# Patient Record
Sex: MALE | Race: WHITE | NOT HISPANIC OR LATINO | Employment: FULL TIME | URBAN - METROPOLITAN AREA
[De-identification: names, ages, dates, MRNs, and addresses within clinical notes are randomized per-mention and may not be internally consistent; named-entity substitution may affect disease eponyms.]

---

## 2023-05-29 DIAGNOSIS — F41.9 ANXIETY: Primary | ICD-10-CM

## 2023-05-30 RX ORDER — NORTRIPTYLINE HYDROCHLORIDE 10 MG/1
CAPSULE ORAL
Qty: 270 CAPSULE | Refills: 3 | Status: SHIPPED | OUTPATIENT
Start: 2023-05-30 | End: 2023-06-09 | Stop reason: SDUPTHER

## 2023-06-09 ENCOUNTER — OFFICE VISIT (OUTPATIENT)
Dept: PRIMARY CARE | Facility: CLINIC | Age: 63
End: 2023-06-09
Payer: COMMERCIAL

## 2023-06-09 VITALS
BODY MASS INDEX: 22.54 KG/M2 | DIASTOLIC BLOOD PRESSURE: 62 MMHG | SYSTOLIC BLOOD PRESSURE: 122 MMHG | OXYGEN SATURATION: 99 % | HEIGHT: 71 IN | WEIGHT: 161 LBS | HEART RATE: 78 BPM

## 2023-06-09 DIAGNOSIS — I10 PRIMARY HYPERTENSION: ICD-10-CM

## 2023-06-09 DIAGNOSIS — E78.5 HYPERLIPIDEMIA, UNSPECIFIED HYPERLIPIDEMIA TYPE: ICD-10-CM

## 2023-06-09 DIAGNOSIS — I25.10 CORONARY ARTERY DISEASE INVOLVING NATIVE CORONARY ARTERY OF NATIVE HEART WITHOUT ANGINA PECTORIS: ICD-10-CM

## 2023-06-09 DIAGNOSIS — Z00.00 PERIODIC HEALTH ASSESSMENT, GENERAL SCREENING, ADULT: Primary | ICD-10-CM

## 2023-06-09 DIAGNOSIS — Z12.5 SCREENING FOR PROSTATE CANCER: ICD-10-CM

## 2023-06-09 DIAGNOSIS — F41.9 ANXIETY: ICD-10-CM

## 2023-06-09 PROCEDURE — 99396 PREV VISIT EST AGE 40-64: CPT | Performed by: INTERNAL MEDICINE

## 2023-06-09 PROCEDURE — 3078F DIAST BP <80 MM HG: CPT | Performed by: INTERNAL MEDICINE

## 2023-06-09 PROCEDURE — 90677 PCV20 VACCINE IM: CPT | Performed by: INTERNAL MEDICINE

## 2023-06-09 PROCEDURE — 3074F SYST BP LT 130 MM HG: CPT | Performed by: INTERNAL MEDICINE

## 2023-06-09 PROCEDURE — 90471 IMMUNIZATION ADMIN: CPT | Performed by: INTERNAL MEDICINE

## 2023-06-09 RX ORDER — PHENYLEPHRINE HCL 10 MG
TABLET ORAL
Status: ON HOLD | COMMUNITY
Start: 2021-05-26 | End: 2023-09-30 | Stop reason: ENTERED-IN-ERROR

## 2023-06-09 RX ORDER — CHLOROQUINE PHOSPHATE 500 MG/1
TABLET, COATED ORAL
Status: ON HOLD | COMMUNITY
Start: 2022-12-13 | End: 2023-09-30 | Stop reason: ENTERED-IN-ERROR

## 2023-06-09 RX ORDER — GUAIFENESIN AND DEXTROMETHORPHAN HYDROBROMIDE 600; 30 MG/1; MG/1
TABLET, EXTENDED RELEASE ORAL
Status: ON HOLD | COMMUNITY
End: 2023-09-30 | Stop reason: ENTERED-IN-ERROR

## 2023-06-09 RX ORDER — LATANOPROST 50 UG/ML
1 SOLUTION/ DROPS OPHTHALMIC NIGHTLY
COMMUNITY
Start: 2020-02-19 | End: 2024-01-26 | Stop reason: SDUPTHER

## 2023-06-09 RX ORDER — CIPROFLOXACIN 500 MG/1
TABLET ORAL
Status: ON HOLD | COMMUNITY
Start: 2022-12-13 | End: 2023-09-30 | Stop reason: ENTERED-IN-ERROR

## 2023-06-09 RX ORDER — GUAIFENESIN 400 MG/1
200 TABLET ORAL NIGHTLY
COMMUNITY
End: 2023-12-06 | Stop reason: ALTCHOICE

## 2023-06-09 RX ORDER — NITROGLYCERIN 0.4 MG/1
TABLET SUBLINGUAL
Status: ON HOLD | COMMUNITY
End: 2023-09-30 | Stop reason: ENTERED-IN-ERROR

## 2023-06-09 RX ORDER — ATENOLOL 25 MG/1
1.5 TABLET ORAL DAILY
Status: ON HOLD | COMMUNITY
Start: 2019-03-14 | End: 2023-09-30 | Stop reason: SDUPTHER

## 2023-06-09 RX ORDER — RANOLAZINE 1000 MG/1
1000 TABLET, EXTENDED RELEASE ORAL 2 TIMES DAILY
COMMUNITY
Start: 2020-09-28 | End: 2024-01-03 | Stop reason: SDUPTHER

## 2023-06-09 RX ORDER — AMLODIPINE BESYLATE 5 MG/1
5 TABLET ORAL DAILY
COMMUNITY
Start: 2023-06-08 | End: 2024-01-03 | Stop reason: SDUPTHER

## 2023-06-09 RX ORDER — MULTIVIT-MIN/IRON FUM/FOLIC AC 7.5 MG-4
1 TABLET ORAL
COMMUNITY
End: 2023-12-06 | Stop reason: ALTCHOICE

## 2023-06-09 RX ORDER — TRIAMCINOLONE ACETONIDE 55 UG/1
1 SPRAY, METERED NASAL EVERY OTHER DAY
COMMUNITY

## 2023-06-09 RX ORDER — ATORVASTATIN CALCIUM 80 MG/1
1 TABLET, FILM COATED ORAL NIGHTLY
COMMUNITY
Start: 2020-05-01 | End: 2024-01-03 | Stop reason: SDUPTHER

## 2023-06-09 RX ORDER — ALBUTEROL SULFATE 90 UG/1
2 AEROSOL, METERED RESPIRATORY (INHALATION) EVERY 6 HOURS PRN
COMMUNITY
Start: 2019-03-19

## 2023-06-09 RX ORDER — NORTRIPTYLINE HYDROCHLORIDE 10 MG/1
30 CAPSULE ORAL NIGHTLY
Qty: 270 CAPSULE | Refills: 3 | Status: SHIPPED | OUTPATIENT
Start: 2023-06-09 | End: 2024-01-03 | Stop reason: SDUPTHER

## 2023-06-09 RX ORDER — ASPIRIN 81 MG/1
1 TABLET ORAL DAILY
COMMUNITY
Start: 2019-10-30

## 2023-06-09 RX ORDER — ISOSORBIDE MONONITRATE 60 MG/1
1 TABLET, EXTENDED RELEASE ORAL DAILY
Status: ON HOLD | COMMUNITY
Start: 2022-05-04 | End: 2023-09-30 | Stop reason: SDUPTHER

## 2023-06-09 RX ORDER — LOPERAMIDE HYDROCHLORIDE 2 MG/1
CAPSULE ORAL
Status: ON HOLD | COMMUNITY
Start: 2022-12-13 | End: 2023-09-30 | Stop reason: ENTERED-IN-ERROR

## 2023-06-09 RX ORDER — UBIQUINOL 100 MG
CAPSULE ORAL
Status: ON HOLD | COMMUNITY
Start: 2021-05-26 | End: 2023-09-30 | Stop reason: ENTERED-IN-ERROR

## 2023-06-09 RX ORDER — FLUTICASONE PROPIONATE AND SALMETEROL 250; 50 UG/1; UG/1
1 POWDER RESPIRATORY (INHALATION) DAILY PRN
COMMUNITY
End: 2024-03-06 | Stop reason: SDUPTHER

## 2023-06-09 RX ORDER — RIVAROXABAN 2.5 MG/1
1 TABLET, FILM COATED ORAL 2 TIMES DAILY
Status: ON HOLD | COMMUNITY
Start: 2021-12-02 | End: 2023-09-30 | Stop reason: ENTERED-IN-ERROR

## 2023-06-09 RX ORDER — VALSARTAN 80 MG/1
1 TABLET ORAL 2 TIMES DAILY
Status: ON HOLD | COMMUNITY
Start: 2020-05-02 | End: 2023-09-30 | Stop reason: ENTERED-IN-ERROR

## 2023-06-09 RX ORDER — MAGNESIUM 200 MG
1 TABLET ORAL DAILY
COMMUNITY
Start: 2019-10-30

## 2023-06-09 ASSESSMENT — ENCOUNTER SYMPTOMS
CARDIOVASCULAR NEGATIVE: 1
RESPIRATORY NEGATIVE: 1
CONSTITUTIONAL NEGATIVE: 1

## 2023-06-09 ASSESSMENT — PAIN SCALES - GENERAL: PAINLEVEL: 0-NO PAIN

## 2023-06-09 NOTE — PROGRESS NOTES
"Subjective   Patient ID: Dc Elizondo is a 62 y.o. male who presents for APE and  Follow-up (Follow up on asthma and HTN/No concerns at the moment. ).  Overall doing well.  Patient is fairly active.  Denies any issues with CP,SOB or dizzy spells.  He has had issues with Cp in the past, but has been doing much better as of late and is trying to wean off of his nitroglycerin.  He admits to some anxiety, but manageable.  Sleep medication helps.  Denies any issues with HA, numbness or tingling.  No issues or changes with bowel or bladder habits.         Review of Systems   Constitutional: Negative.    HENT: Negative.     Respiratory: Negative.     Cardiovascular: Negative.    ROS is otherwise unremarkable.     Objective   /62 (BP Location: Left arm, Patient Position: Sitting, BP Cuff Size: Adult)   Pulse 78   Ht 1.803 m (5' 11\")   Wt 73 kg (161 lb)   SpO2 99%   BMI 22.45 kg/m²     Physical Exam  Constitutional:       General: He is not in acute distress.     Appearance: Normal appearance. He is not ill-appearing.   HENT:      Head: Normocephalic and atraumatic.      Nose: Nose normal.   Eyes:      Extraocular Movements: Extraocular movements intact.      Conjunctiva/sclera: Conjunctivae normal.      Pupils: Pupils are equal, round, and reactive to light.   Cardiovascular:      Rate and Rhythm: Normal rate and regular rhythm.      Heart sounds: Normal heart sounds.   Pulmonary:      Effort: Pulmonary effort is normal.      Breath sounds: Normal breath sounds.   Abdominal:      General: There is no distension.   Musculoskeletal:         General: Normal range of motion.      Cervical back: Neck supple.   Neurological:      General: No focal deficit present.      Mental Status: He is alert.      Gait: Gait normal.   Psychiatric:         Mood and Affect: Mood normal.         Behavior: Behavior normal.         Assessment/Plan   Problem List Items Addressed This Visit    None  Visit Diagnoses       " Periodic health assessment, general screening, adult    -  Primary    Relevant Orders    CBC    Comprehensive Metabolic Panel    Lipid Panel    Thyroid Stimulating Hormone    Primary hypertension        Hyperlipidemia, unspecified hyperlipidemia type        Coronary artery disease involving native coronary artery of native heart without angina pectoris        Relevant Medications    ranolazine (Ranexa) 1,000 mg 12 hr tablet    nitroglycerin (Nitrostat) 0.4 mg SL tablet    isosorbide mononitrate ER (Imdur) 60 mg 24 hr tablet    atenolol (Tenormin) 25 mg tablet    amLODIPine (Norvasc) 5 mg tablet    Screening for prostate cancer        Relevant Orders    Prostate Specific Antigen    Anxiety        Relevant Medications    nortriptyline (Pamelor) 10 mg capsule        Physical exam is unremarkable.  We reviewed and discussed all the above.  We discussed current medications as well as most recent test results.  We discussed the importance and benefits of a healthy diet that is both low in sugars and low in saturated fats.  We reviewed and discussed the benefits of regular physical exercise.  We also discussed the importance of stress management and good sleep hygiene.  We will continue to work on lifestyle improvements and follow-up in 6 months, sooner if any issues should arise.

## 2023-07-20 ENCOUNTER — LAB (OUTPATIENT)
Dept: LAB | Facility: LAB | Age: 63
End: 2023-07-20
Payer: COMMERCIAL

## 2023-07-20 DIAGNOSIS — Z12.5 SCREENING FOR PROSTATE CANCER: ICD-10-CM

## 2023-07-20 DIAGNOSIS — Z00.00 PERIODIC HEALTH ASSESSMENT, GENERAL SCREENING, ADULT: ICD-10-CM

## 2023-07-20 LAB
ALANINE AMINOTRANSFERASE (SGPT) (U/L) IN SER/PLAS: 22 U/L (ref 10–52)
ALBUMIN (G/DL) IN SER/PLAS: 4.3 G/DL (ref 3.4–5)
ALKALINE PHOSPHATASE (U/L) IN SER/PLAS: 56 U/L (ref 33–136)
ANION GAP IN SER/PLAS: 11 MMOL/L (ref 10–20)
ASPARTATE AMINOTRANSFERASE (SGOT) (U/L) IN SER/PLAS: 19 U/L (ref 9–39)
BILIRUBIN TOTAL (MG/DL) IN SER/PLAS: 1 MG/DL (ref 0–1.2)
CALCIUM (MG/DL) IN SER/PLAS: 9.5 MG/DL (ref 8.6–10.6)
CARBON DIOXIDE, TOTAL (MMOL/L) IN SER/PLAS: 28 MMOL/L (ref 21–32)
CHLORIDE (MMOL/L) IN SER/PLAS: 107 MMOL/L (ref 98–107)
CHOLESTEROL (MG/DL) IN SER/PLAS: 116 MG/DL (ref 0–199)
CHOLESTEROL IN HDL (MG/DL) IN SER/PLAS: 39.9 MG/DL
CHOLESTEROL/HDL RATIO: 2.9
CREATININE (MG/DL) IN SER/PLAS: 0.91 MG/DL (ref 0.5–1.3)
ERYTHROCYTE DISTRIBUTION WIDTH (RATIO) BY AUTOMATED COUNT: 14.3 % (ref 11.5–14.5)
ERYTHROCYTE MEAN CORPUSCULAR HEMOGLOBIN CONCENTRATION (G/DL) BY AUTOMATED: 31.7 G/DL (ref 32–36)
ERYTHROCYTE MEAN CORPUSCULAR VOLUME (FL) BY AUTOMATED COUNT: 88 FL (ref 80–100)
ERYTHROCYTES (10*6/UL) IN BLOOD BY AUTOMATED COUNT: 4.96 X10E12/L (ref 4.5–5.9)
GFR MALE: >90 ML/MIN/1.73M2
GLUCOSE (MG/DL) IN SER/PLAS: 91 MG/DL (ref 74–99)
HEMATOCRIT (%) IN BLOOD BY AUTOMATED COUNT: 43.6 % (ref 41–52)
HEMOGLOBIN (G/DL) IN BLOOD: 13.8 G/DL (ref 13.5–17.5)
LDL: 57 MG/DL (ref 0–99)
LEUKOCYTES (10*3/UL) IN BLOOD BY AUTOMATED COUNT: 5.2 X10E9/L (ref 4.4–11.3)
NRBC (PER 100 WBCS) BY AUTOMATED COUNT: 0 /100 WBC (ref 0–0)
PLATELETS (10*3/UL) IN BLOOD AUTOMATED COUNT: 256 X10E9/L (ref 150–450)
POTASSIUM (MMOL/L) IN SER/PLAS: 4.9 MMOL/L (ref 3.5–5.3)
PROSTATE SPECIFIC AG (NG/ML) IN SER/PLAS: 2.39 NG/ML (ref 0–4)
PROTEIN TOTAL: 6.8 G/DL (ref 6.4–8.2)
SODIUM (MMOL/L) IN SER/PLAS: 141 MMOL/L (ref 136–145)
THYROTROPIN (MIU/L) IN SER/PLAS BY DETECTION LIMIT <= 0.05 MIU/L: 1.95 MIU/L (ref 0.44–3.98)
TRIGLYCERIDE (MG/DL) IN SER/PLAS: 94 MG/DL (ref 0–149)
UREA NITROGEN (MG/DL) IN SER/PLAS: 21 MG/DL (ref 6–23)
VLDL: 19 MG/DL (ref 0–40)

## 2023-07-20 PROCEDURE — 80053 COMPREHEN METABOLIC PANEL: CPT

## 2023-07-20 PROCEDURE — 84153 ASSAY OF PSA TOTAL: CPT

## 2023-07-20 PROCEDURE — 85027 COMPLETE CBC AUTOMATED: CPT

## 2023-07-20 PROCEDURE — 80061 LIPID PANEL: CPT

## 2023-07-20 PROCEDURE — 84443 ASSAY THYROID STIM HORMONE: CPT

## 2023-07-20 PROCEDURE — 36415 COLL VENOUS BLD VENIPUNCTURE: CPT

## 2023-09-29 ENCOUNTER — HOSPITAL ENCOUNTER (OUTPATIENT)
Dept: DATA CONVERSION | Facility: HOSPITAL | Age: 63
Setting detail: OBSERVATION
Discharge: HOME | End: 2023-09-30
Attending: INTERNAL MEDICINE | Admitting: INTERNAL MEDICINE
Payer: COMMERCIAL

## 2023-09-29 DIAGNOSIS — I25.10 CORONARY ARTERY DISEASE INVOLVING NATIVE CORONARY ARTERY OF NATIVE HEART, UNSPECIFIED WHETHER ANGINA PRESENT: ICD-10-CM

## 2023-09-29 DIAGNOSIS — F43.9 CHEST PAIN DUE TO PSYCHOLOGICAL STRESS: Primary | ICD-10-CM

## 2023-09-29 DIAGNOSIS — R07.9 CHEST PAIN, UNSPECIFIED: ICD-10-CM

## 2023-09-29 DIAGNOSIS — R07.9 CHEST PAIN DUE TO PSYCHOLOGICAL STRESS: Primary | ICD-10-CM

## 2023-09-29 LAB
ALANINE AMINOTRANSFERASE (SGPT) (U/L) IN SER/PLAS: 22 U/L (ref 10–52)
ALBUMIN (G/DL) IN SER/PLAS: 4.4 G/DL (ref 3.4–5)
ALKALINE PHOSPHATASE (U/L) IN SER/PLAS: 56 U/L (ref 33–136)
ANION GAP IN SER/PLAS: 11 MMOL/L (ref 10–20)
ANION GAP IN SER/PLAS: 9 MMOL/L (ref 10–20)
ASPARTATE AMINOTRANSFERASE (SGOT) (U/L) IN SER/PLAS: 19 U/L (ref 9–39)
BASOPHILS (10*3/UL) IN BLOOD BY AUTOMATED COUNT: 0.03 X10E9/L (ref 0–0.1)
BASOPHILS/100 LEUKOCYTES IN BLOOD BY AUTOMATED COUNT: 0.6 % (ref 0–2)
BILIRUBIN TOTAL (MG/DL) IN SER/PLAS: 0.9 MG/DL (ref 0–1.2)
CALCIUM (MG/DL) IN SER/PLAS: 9.3 MG/DL (ref 8.6–10.3)
CALCIUM (MG/DL) IN SER/PLAS: 9.3 MG/DL (ref 8.6–10.3)
CARBON DIOXIDE, TOTAL (MMOL/L) IN SER/PLAS: 27 MMOL/L (ref 21–32)
CARBON DIOXIDE, TOTAL (MMOL/L) IN SER/PLAS: 30 MMOL/L (ref 21–32)
CHLORIDE (MMOL/L) IN SER/PLAS: 105 MMOL/L (ref 98–107)
CHLORIDE (MMOL/L) IN SER/PLAS: 107 MMOL/L (ref 98–107)
CREATININE (MG/DL) IN SER/PLAS: 0.86 MG/DL (ref 0.5–1.3)
CREATININE (MG/DL) IN SER/PLAS: 0.9 MG/DL (ref 0.5–1.3)
D-DIMER, QUANTITATIVE VTE EXCLUSION: <215 NG/ML FEU
EOSINOPHILS (10*3/UL) IN BLOOD BY AUTOMATED COUNT: 0.25 X10E9/L (ref 0–0.7)
EOSINOPHILS/100 LEUKOCYTES IN BLOOD BY AUTOMATED COUNT: 4.9 % (ref 0–6)
ERYTHROCYTE DISTRIBUTION WIDTH (RATIO) BY AUTOMATED COUNT: 13.2 % (ref 11.5–14.5)
ERYTHROCYTE DISTRIBUTION WIDTH (RATIO) BY AUTOMATED COUNT: 13.2 % (ref 11.5–14.5)
ERYTHROCYTE MEAN CORPUSCULAR HEMOGLOBIN CONCENTRATION (G/DL) BY AUTOMATED: 31.7 G/DL (ref 32–36)
ERYTHROCYTE MEAN CORPUSCULAR HEMOGLOBIN CONCENTRATION (G/DL) BY AUTOMATED: 31.9 G/DL (ref 32–36)
ERYTHROCYTE MEAN CORPUSCULAR VOLUME (FL) BY AUTOMATED COUNT: 88 FL (ref 80–100)
ERYTHROCYTE MEAN CORPUSCULAR VOLUME (FL) BY AUTOMATED COUNT: 88 FL (ref 80–100)
ERYTHROCYTES (10*6/UL) IN BLOOD BY AUTOMATED COUNT: 4.81 X10E12/L (ref 4.5–5.9)
ERYTHROCYTES (10*6/UL) IN BLOOD BY AUTOMATED COUNT: 4.87 X10E12/L (ref 4.5–5.9)
GFR MALE: >90 ML/MIN/1.73M2
GFR MALE: >90 ML/MIN/1.73M2
GLUCOSE (MG/DL) IN SER/PLAS: 85 MG/DL (ref 74–99)
GLUCOSE (MG/DL) IN SER/PLAS: 94 MG/DL (ref 74–99)
HEMATOCRIT (%) IN BLOOD BY AUTOMATED COUNT: 42.3 % (ref 41–52)
HEMATOCRIT (%) IN BLOOD BY AUTOMATED COUNT: 42.7 % (ref 41–52)
HEMOGLOBIN (G/DL) IN BLOOD: 13.4 G/DL (ref 13.5–17.5)
HEMOGLOBIN (G/DL) IN BLOOD: 13.6 G/DL (ref 13.5–17.5)
IMMATURE GRANULOCYTES/100 LEUKOCYTES IN BLOOD BY AUTOMATED COUNT: 0.2 % (ref 0–0.9)
LEUKOCYTES (10*3/UL) IN BLOOD BY AUTOMATED COUNT: 5.1 X10E9/L (ref 4.4–11.3)
LEUKOCYTES (10*3/UL) IN BLOOD BY AUTOMATED COUNT: 5.2 X10E9/L (ref 4.4–11.3)
LYMPHOCYTES (10*3/UL) IN BLOOD BY AUTOMATED COUNT: 1.38 X10E9/L (ref 1.2–4.8)
LYMPHOCYTES/100 LEUKOCYTES IN BLOOD BY AUTOMATED COUNT: 27.2 % (ref 13–44)
MAGNESIUM (MG/DL) IN SER/PLAS: 2.11 MG/DL (ref 1.6–2.4)
MONOCYTES (10*3/UL) IN BLOOD BY AUTOMATED COUNT: 0.6 X10E9/L (ref 0.1–1)
MONOCYTES/100 LEUKOCYTES IN BLOOD BY AUTOMATED COUNT: 11.8 % (ref 2–10)
NEUTROPHILS (10*3/UL) IN BLOOD BY AUTOMATED COUNT: 2.8 X10E9/L (ref 1.2–7.7)
NEUTROPHILS/100 LEUKOCYTES IN BLOOD BY AUTOMATED COUNT: 55.3 % (ref 40–80)
NRBC (PER 100 WBCS) BY AUTOMATED COUNT: 0 /100 WBC (ref 0–0)
NRBC (PER 100 WBCS) BY AUTOMATED COUNT: 0 /100 WBC (ref 0–0)
PLATELETS (10*3/UL) IN BLOOD AUTOMATED COUNT: 235 X10E9/L (ref 150–450)
PLATELETS (10*3/UL) IN BLOOD AUTOMATED COUNT: 238 X10E9/L (ref 150–450)
POTASSIUM (MMOL/L) IN SER/PLAS: 3.8 MMOL/L (ref 3.5–5.3)
POTASSIUM (MMOL/L) IN SER/PLAS: 4.6 MMOL/L (ref 3.5–5.3)
PROTEIN TOTAL: 7.4 G/DL (ref 6.4–8.2)
SARS-COV-2 RESULT: NOT DETECTED
SODIUM (MMOL/L) IN SER/PLAS: 140 MMOL/L (ref 136–145)
SODIUM (MMOL/L) IN SER/PLAS: 140 MMOL/L (ref 136–145)
TROPONIN I, HIGH SENSITIVITY: 3 NG/L (ref 0–20)
TROPONIN I, HIGH SENSITIVITY: 6 NG/L (ref 0–20)
TROPONIN I, HIGH SENSITIVITY: 7 NG/L (ref 0–20)
UREA NITROGEN (MG/DL) IN SER/PLAS: 17 MG/DL (ref 6–23)
UREA NITROGEN (MG/DL) IN SER/PLAS: 18 MG/DL (ref 6–23)

## 2023-09-29 PROCEDURE — 71045 X-RAY EXAM CHEST 1 VIEW: CPT

## 2023-09-29 PROCEDURE — 36415 COLL VENOUS BLD VENIPUNCTURE: CPT

## 2023-09-29 PROCEDURE — 85379 FIBRIN DEGRADATION QUANT: CPT

## 2023-09-29 PROCEDURE — 99285 EMERGENCY DEPT VISIT HI MDM: CPT

## 2023-09-29 PROCEDURE — 84484 ASSAY OF TROPONIN QUANT: CPT

## 2023-09-29 PROCEDURE — 9990 CHARGE CONVERSION

## 2023-09-29 PROCEDURE — 85027 COMPLETE CBC AUTOMATED: CPT

## 2023-09-29 PROCEDURE — 80053 COMPREHEN METABOLIC PANEL: CPT

## 2023-09-29 PROCEDURE — 87635 SARS-COV-2 COVID-19 AMP PRB: CPT

## 2023-09-29 PROCEDURE — 83735 ASSAY OF MAGNESIUM: CPT

## 2023-09-29 PROCEDURE — 85025 COMPLETE CBC W/AUTO DIFF WBC: CPT

## 2023-09-29 RX ORDER — LATANOPROST 50 UG/ML
1 SOLUTION/ DROPS OPHTHALMIC NIGHTLY
Status: DISCONTINUED | OUTPATIENT
Start: 2023-09-30 | End: 2023-09-30 | Stop reason: HOSPADM

## 2023-09-29 RX ORDER — FLUTICASONE PROPIONATE 50 MCG
1 SPRAY, SUSPENSION (ML) NASAL DAILY
Status: DISCONTINUED | OUTPATIENT
Start: 2023-09-30 | End: 2023-09-30 | Stop reason: HOSPADM

## 2023-09-29 RX ORDER — FORMOTEROL FUMARATE DIHYDRATE 20 UG/2ML
20 SOLUTION RESPIRATORY (INHALATION) EVERY 12 HOURS
Status: DISCONTINUED | OUTPATIENT
Start: 2023-09-30 | End: 2023-09-30 | Stop reason: HOSPADM

## 2023-09-29 RX ORDER — AMLODIPINE BESYLATE 5 MG/1
5 TABLET ORAL DAILY
Status: DISCONTINUED | OUTPATIENT
Start: 2023-09-30 | End: 2023-09-30 | Stop reason: HOSPADM

## 2023-09-29 RX ORDER — MULTIVIT-MIN/IRON FUM/FOLIC AC 7.5 MG-4
1 TABLET ORAL DAILY
Status: DISCONTINUED | OUTPATIENT
Start: 2023-09-30 | End: 2023-09-30 | Stop reason: HOSPADM

## 2023-09-29 RX ORDER — RANOLAZINE 500 MG/1
1000 TABLET, EXTENDED RELEASE ORAL 2 TIMES DAILY
Status: DISCONTINUED | OUTPATIENT
Start: 2023-09-30 | End: 2023-09-30 | Stop reason: HOSPADM

## 2023-09-29 RX ORDER — BUDESONIDE 0.5 MG/2ML
0.5 INHALANT ORAL EVERY 12 HOURS
Status: DISCONTINUED | OUTPATIENT
Start: 2023-09-30 | End: 2023-09-30

## 2023-09-29 RX ORDER — NAPROXEN SODIUM 220 MG/1
81 TABLET, FILM COATED ORAL DAILY
Status: DISCONTINUED | OUTPATIENT
Start: 2023-09-30 | End: 2023-09-30 | Stop reason: HOSPADM

## 2023-09-29 RX ORDER — NORTRIPTYLINE HYDROCHLORIDE 25 MG/1
25 CAPSULE ORAL NIGHTLY
Status: DISCONTINUED | OUTPATIENT
Start: 2023-09-30 | End: 2023-09-30 | Stop reason: HOSPADM

## 2023-09-29 RX ORDER — NITROGLYCERIN 0.4 MG/1
0.4 TABLET SUBLINGUAL EVERY 5 MIN PRN
Status: DISCONTINUED | OUTPATIENT
Start: 2023-09-30 | End: 2023-09-30 | Stop reason: HOSPADM

## 2023-09-29 RX ORDER — ATENOLOL 25 MG/1
37.5 TABLET ORAL DAILY
Status: DISCONTINUED | OUTPATIENT
Start: 2023-09-30 | End: 2023-09-30

## 2023-09-29 RX ORDER — LANOLIN ALCOHOL/MO/W.PET/CERES
400 CREAM (GRAM) TOPICAL DAILY
Status: DISCONTINUED | OUTPATIENT
Start: 2023-09-30 | End: 2023-09-30 | Stop reason: HOSPADM

## 2023-09-29 RX ORDER — ISOSORBIDE MONONITRATE 30 MG/1
30 TABLET, EXTENDED RELEASE ORAL DAILY
Status: DISCONTINUED | OUTPATIENT
Start: 2023-09-30 | End: 2023-09-30

## 2023-09-29 RX ORDER — VALSARTAN 160 MG/1
160 TABLET ORAL EVERY 12 HOURS SCHEDULED
Status: DISCONTINUED | OUTPATIENT
Start: 2023-09-30 | End: 2023-09-30 | Stop reason: HOSPADM

## 2023-09-29 RX ORDER — SODIUM CHLORIDE 0.9 % (FLUSH) 0.9 %
10 SYRINGE (ML) INJECTION EVERY 8 HOURS PRN
Status: DISCONTINUED | OUTPATIENT
Start: 2023-09-30 | End: 2023-09-30 | Stop reason: HOSPADM

## 2023-09-29 RX ORDER — GUAIFENESIN 600 MG/1
600 TABLET, EXTENDED RELEASE ORAL 2 TIMES DAILY
Status: DISCONTINUED | OUTPATIENT
Start: 2023-09-30 | End: 2023-09-30 | Stop reason: HOSPADM

## 2023-09-29 RX ORDER — ALBUTEROL SULFATE 0.83 MG/ML
3 SOLUTION RESPIRATORY (INHALATION) EVERY 4 HOURS PRN
Status: DISCONTINUED | OUTPATIENT
Start: 2023-09-30 | End: 2023-09-30 | Stop reason: HOSPADM

## 2023-09-29 RX ORDER — ATORVASTATIN CALCIUM 80 MG/1
80 TABLET, FILM COATED ORAL DAILY
Status: DISCONTINUED | OUTPATIENT
Start: 2023-09-30 | End: 2023-09-30 | Stop reason: HOSPADM

## 2023-09-30 ENCOUNTER — APPOINTMENT (OUTPATIENT)
Dept: CARDIOLOGY | Facility: HOSPITAL | Age: 63
End: 2023-09-30
Payer: COMMERCIAL

## 2023-09-30 VITALS
SYSTOLIC BLOOD PRESSURE: 97 MMHG | TEMPERATURE: 97.3 F | WEIGHT: 154.32 LBS | OXYGEN SATURATION: 99 % | HEIGHT: 71 IN | BODY MASS INDEX: 21.6 KG/M2 | RESPIRATION RATE: 16 BRPM | HEART RATE: 71 BPM | DIASTOLIC BLOOD PRESSURE: 54 MMHG

## 2023-09-30 PROBLEM — G47.00 INSOMNIA: Status: ACTIVE | Noted: 2023-09-30

## 2023-09-30 PROBLEM — R35.1 NOCTURIA: Status: ACTIVE | Noted: 2023-09-30

## 2023-09-30 PROBLEM — I95.1 ORTHOSTATIC HYPOTENSION: Status: ACTIVE | Noted: 2023-09-30

## 2023-09-30 PROBLEM — J30.9 ALLERGIC RHINITIS: Status: ACTIVE | Noted: 2023-09-30

## 2023-09-30 PROBLEM — I25.118 STABLE ANGINA PECTORIS DUE TO ARTERIOSCLEROSIS OF CORONARY ARTERY (CMS-HCC): Status: RESOLVED | Noted: 2023-09-30 | Resolved: 2023-09-30

## 2023-09-30 PROBLEM — I25.118 STABLE ANGINA PECTORIS DUE TO ARTERIOSCLEROSIS OF CORONARY ARTERY (CMS-HCC): Status: ACTIVE | Noted: 2023-09-30

## 2023-09-30 PROBLEM — F43.9 CHEST PAIN DUE TO PSYCHOLOGICAL STRESS: Status: ACTIVE | Noted: 2023-09-30

## 2023-09-30 PROBLEM — E78.5 DYSLIPIDEMIA: Status: ACTIVE | Noted: 2023-09-30

## 2023-09-30 PROBLEM — J45.909 ASTHMA (HHS-HCC): Status: ACTIVE | Noted: 2023-09-30

## 2023-09-30 PROBLEM — I25.10 ATHSCL HEART DISEASE OF NATIVE CORONARY ARTERY W/O ANG PCTRS: Status: ACTIVE | Noted: 2023-09-30

## 2023-09-30 PROBLEM — I20.9 ANGINA PECTORIS (CMS-HCC): Status: ACTIVE | Noted: 2023-09-30

## 2023-09-30 PROBLEM — F41.9 ANXIETY: Status: ACTIVE | Noted: 2023-09-30

## 2023-09-30 PROBLEM — H40.003 GLAUCOMA SUSPECT OF BOTH EYES: Status: ACTIVE | Noted: 2023-09-30

## 2023-09-30 PROBLEM — R07.9 CHEST PAIN: Status: ACTIVE | Noted: 2023-09-30

## 2023-09-30 PROBLEM — I10 HTN (HYPERTENSION), BENIGN: Status: ACTIVE | Noted: 2023-09-30

## 2023-09-30 PROBLEM — R97.20 PSA ELEVATION: Status: ACTIVE | Noted: 2023-09-30

## 2023-09-30 LAB
ANION GAP SERPL CALC-SCNC: 10 MMOL/L (ref 10–20)
BUN SERPL-MCNC: 12 MG/DL (ref 6–23)
CALCIUM SERPL-MCNC: 9.3 MG/DL (ref 8.6–10.3)
CARDIAC TROPONIN I PNL SERPL HS: 6 NG/L (ref 0–20)
CHLORIDE SERPL-SCNC: 107 MMOL/L (ref 98–107)
CO2 SERPL-SCNC: 28 MMOL/L (ref 21–32)
CREAT SERPL-MCNC: 0.88 MG/DL (ref 0.5–1.3)
EJECTION FRACTION APICAL 4 CHAMBER: 54.5
ERYTHROCYTE [DISTWIDTH] IN BLOOD BY AUTOMATED COUNT: 13.2 % (ref 11.5–14.5)
GFR SERPL CREATININE-BSD FRML MDRD: >90 ML/MIN/1.73M*2
GLUCOSE SERPL-MCNC: 86 MG/DL (ref 74–99)
HCT VFR BLD AUTO: 42.5 % (ref 41–52)
HGB BLD-MCNC: 13.5 G/DL (ref 13.5–17.5)
MAGNESIUM SERPL-MCNC: 2.09 MG/DL (ref 1.6–2.4)
MCH RBC QN AUTO: 28 PG (ref 26–34)
MCHC RBC AUTO-ENTMCNC: 31.8 G/DL (ref 32–36)
MCV RBC AUTO: 88 FL (ref 80–100)
NRBC BLD-RTO: 0 /100 WBCS (ref 0–0)
PLATELET # BLD AUTO: 247 X10*3/UL (ref 150–450)
PMV BLD AUTO: 10.1 FL (ref 7.5–11.5)
POTASSIUM SERPL-SCNC: 4.5 MMOL/L (ref 3.5–5.3)
RBC # BLD AUTO: 4.83 X10*6/UL (ref 4.5–5.9)
SODIUM SERPL-SCNC: 140 MMOL/L (ref 136–145)
WBC # BLD AUTO: 4.1 X10*3/UL (ref 4.4–11.3)

## 2023-09-30 PROCEDURE — 87635 SARS-COV-2 COVID-19 AMP PRB: CPT

## 2023-09-30 PROCEDURE — 84484 ASSAY OF TROPONIN QUANT: CPT

## 2023-09-30 PROCEDURE — 36415 COLL VENOUS BLD VENIPUNCTURE: CPT

## 2023-09-30 PROCEDURE — 85379 FIBRIN DEGRADATION QUANT: CPT

## 2023-09-30 PROCEDURE — 80048 BASIC METABOLIC PNL TOTAL CA: CPT | Performed by: INTERNAL MEDICINE

## 2023-09-30 PROCEDURE — 80053 COMPREHEN METABOLIC PANEL: CPT

## 2023-09-30 PROCEDURE — 71045 X-RAY EXAM CHEST 1 VIEW: CPT

## 2023-09-30 PROCEDURE — 94640 AIRWAY INHALATION TREATMENT: CPT

## 2023-09-30 PROCEDURE — 85027 COMPLETE CBC AUTOMATED: CPT | Performed by: INTERNAL MEDICINE

## 2023-09-30 PROCEDURE — 36415 COLL VENOUS BLD VENIPUNCTURE: CPT | Performed by: INTERNAL MEDICINE

## 2023-09-30 PROCEDURE — 93306 TTE W/DOPPLER COMPLETE: CPT

## 2023-09-30 PROCEDURE — 2500000001 HC RX 250 WO HCPCS SELF ADMINISTERED DRUGS (ALT 637 FOR MEDICARE OP): Performed by: INTERNAL MEDICINE

## 2023-09-30 PROCEDURE — 85027 COMPLETE CBC AUTOMATED: CPT | Mod: 91,59

## 2023-09-30 PROCEDURE — 84484 ASSAY OF TROPONIN QUANT: CPT | Performed by: INTERNAL MEDICINE

## 2023-09-30 PROCEDURE — G0378 HOSPITAL OBSERVATION PER HR: HCPCS

## 2023-09-30 PROCEDURE — 9990 CHARGE CONVERSION: Mod: 91,59

## 2023-09-30 PROCEDURE — 83735 ASSAY OF MAGNESIUM: CPT

## 2023-09-30 PROCEDURE — 99239 HOSP IP/OBS DSCHRG MGMT >30: CPT | Performed by: INTERNAL MEDICINE

## 2023-09-30 PROCEDURE — 2500000004 HC RX 250 GENERAL PHARMACY W/ HCPCS (ALT 636 FOR OP/ED): Performed by: INTERNAL MEDICINE

## 2023-09-30 PROCEDURE — 2500000002 HC RX 250 W HCPCS SELF ADMINISTERED DRUGS (ALT 637 FOR MEDICARE OP, ALT 636 FOR OP/ED): Performed by: INTERNAL MEDICINE

## 2023-09-30 PROCEDURE — 83735 ASSAY OF MAGNESIUM: CPT | Performed by: INTERNAL MEDICINE

## 2023-09-30 PROCEDURE — 85025 COMPLETE CBC W/AUTO DIFF WBC: CPT

## 2023-09-30 RX ORDER — GUAIFENESIN 400 MG/1
400 TABLET ORAL EVERY MORNING
COMMUNITY

## 2023-09-30 RX ORDER — ISOSORBIDE MONONITRATE 60 MG/1
60 TABLET, EXTENDED RELEASE ORAL DAILY
Qty: 30 TABLET | Refills: 1 | Status: SHIPPED | OUTPATIENT
Start: 2023-09-30 | End: 2023-09-30 | Stop reason: HOSPADM

## 2023-09-30 RX ORDER — BUDESONIDE 0.5 MG/2ML
0.5 INHALANT ORAL EVERY 12 HOURS
Status: DISCONTINUED | OUTPATIENT
Start: 2023-09-30 | End: 2023-09-30 | Stop reason: HOSPADM

## 2023-09-30 RX ORDER — FLUTICASONE PROPIONATE 50 MCG
1 SPRAY, SUSPENSION (ML) NASAL EVERY OTHER DAY
COMMUNITY

## 2023-09-30 RX ORDER — VALSARTAN 160 MG/1
160 TABLET ORAL 2 TIMES DAILY
COMMUNITY
End: 2024-01-03 | Stop reason: SDUPTHER

## 2023-09-30 RX ORDER — UBIQUINOL 100 MG
200 CAPSULE ORAL DAILY
COMMUNITY

## 2023-09-30 RX ORDER — ATENOLOL 25 MG/1
25 TABLET ORAL DAILY
Qty: 30 TABLET | Refills: 1 | Status: SHIPPED | OUTPATIENT
Start: 2023-09-30 | End: 2024-01-03 | Stop reason: SDUPTHER

## 2023-09-30 RX ORDER — ATENOLOL 25 MG/1
25 TABLET ORAL DAILY
Status: DISCONTINUED | OUTPATIENT
Start: 2023-10-01 | End: 2023-09-30 | Stop reason: HOSPADM

## 2023-09-30 RX ADMIN — GUAIFENESIN 600 MG: 600 TABLET ORAL at 08:32

## 2023-09-30 RX ADMIN — AMLODIPINE BESYLATE 5 MG: 5 TABLET ORAL at 09:00

## 2023-09-30 RX ADMIN — ASPIRIN 81 MG 81 MG: 81 TABLET ORAL at 08:27

## 2023-09-30 RX ADMIN — FLUTICASONE PROPIONATE 1 SPRAY: 50 SPRAY, METERED NASAL at 08:34

## 2023-09-30 RX ADMIN — MULTIPLE VITAMINS W/ MINERALS TAB 1 TABLET: TAB at 08:31

## 2023-09-30 RX ADMIN — RIVAROXABAN 2.5 MG: 2.5 TABLET, FILM COATED ORAL at 09:00

## 2023-09-30 RX ADMIN — RIVAROXABAN 2.5 MG: 2.5 TABLET, FILM COATED ORAL at 17:00

## 2023-09-30 RX ADMIN — BUDESONIDE 0.5 MG: 0.5 INHALANT RESPIRATORY (INHALATION) at 09:52

## 2023-09-30 RX ADMIN — ISOSORBIDE MONONITRATE 30 MG: 30 TABLET, EXTENDED RELEASE ORAL at 09:00

## 2023-09-30 RX ADMIN — Medication 400 MG: at 08:33

## 2023-09-30 RX ADMIN — FORMOTEROL FUMARATE DIHYDRATE 20 MCG: 20 SOLUTION RESPIRATORY (INHALATION) at 09:52

## 2023-09-30 RX ADMIN — RANOLAZINE 1000 MG: 500 TABLET, EXTENDED RELEASE ORAL at 08:30

## 2023-09-30 RX ADMIN — VALSARTAN 160 MG: 160 TABLET, FILM COATED ORAL at 09:00

## 2023-09-30 NOTE — H&P
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   Since his last appointment with Dr. Benjamín Rodriguez, he had reduced his Imdur dose and has since been having occasional episodes of chest discomfort, he was having to be earlier in the week, the episode that happened earlier in the day that prompted him  to come to the emergency department was also associated with a feeling of fatigue as well as diaphoresis, although there was no other symptoms at that time, given his recent stress he decided that he should come into the emergency department for full  evaluation; he did try going to work earlier in the day but the symptoms did not seem to resolved.    In the emergency department, heart rate 60, respiratory rate 18, SPO2 100% on ambient air, blood pressure 132/71; labs did show that there were normal blood counts, metabolic panel, high sensitivity troponin with minimal elevation and essentially flat,  ECG was reviewed and showed a normal sinus rhythm without ST or T wave abnormalities; the patient was administered the remainder of a full dose aspirin, he was admitted to the hospitalist service to be kept under observation for cardiology consultation.    PMH: CAD, HTN, HLD, asthma  PSH: denies  Soc: 1 glass of wine daily, no tobacco or illicit drug use, lives at home  with  Goldy, grandson  FMH: father with MI/CABG at age 65, mother with HTN  PCP: Dr. Mccall  Code: Full Code    A 12 point ROS was elicited from the patient and negative except as noted above in the HPI    Social History:   Social History:  Smoking Status never smoker (1)   Alcohol Use occasionally(1)   Drug Use denies (1)              Allergies:  ·  NKDA :   ·  IVP  DYE: Anaphylaxis    Medications Prior to Admission:     albuterol 90 mcg/inh inhalation aerosol: 2 puff(s) inhaled every 6 hours, As Needed  latanoprost 0.005% ophthalmic solution: 1 drop(s) in each eye once a day (in the evening)  fluticasone-salmeterol 250 mcg-50 mcg inhalation powder: 1 puff(s) inhaled once a day (in the morning), As Needed - for shortness of breath  aspirin 81 mg oral tablet: 1 tab(s) orally once a day (in the morning)  Multiple Vitamins with Minerals oral tablet: 1 tab(s) orally Monday through Friday IN THE MORNINGS  TURMERIC AND CURCUMIN: 2 tab(s) orally once a day (in the morning)  atorvastatin 80 mg oral tablet: 1 tab(s) orally once a day  atenolol 25 mg oral tablet: 1.5 tab(s) orally once a day (in the morning)  ranolazine 1000 mg oral tablet, extended release: 1 tab(s) orally 2 times a day  valsartan 160 mg oral tablet: 1 tab(s) orally 2 times a day  isosorbide mononitrate 60 mg oral tablet, extended release: 1 tab(s) orally once a day (in the morning)  rivaroxaban 2.5 mg oral tablet: 1 tab(s) orally 2 times a day  amLODIPine 5 mg oral tablet: 1 tab(s) orally once a day  nortriptyline 10 mg oral capsule: 2-3 cap(s) orally once a day (at bedtime)  guaiFENesin 400 mg oral tablet: 0.5 tab(s) orally once a day (at bedtime)  guaiFENesin 400 mg oral tablet: 1 tab(s) orally once a day  MAGNESIUM AND POTASSIUM 600MG-198M tab(s) orally once a day  CoQ10 100 mg oral capsule: 2 cap(s) orally once a day  Nasacort 55 mcg/inh nasal aerosol: 1 spray(s) in each nostril every other day alternating with flonase  Flonase 50 mcg/inh  nasal spray: 1 spray(s) nasal every other day alternating with nasacort  Nitrostat 0.4 mg sublingual tablet: 1 tab(s) sublingual every 5 minutes, As Needed - for chest pain.    Objective:     Objective Information:      T   P  R  BP   MAP  SpO2   Value  35.8  60  18  151/85      100%  Date/Time 9/29 15:50 9/29 15:50 9/29 15:50 9/29 15:50    9/29 15:50  Range  (35.8C - 36.3C )  (58 - 60 )  (18 - 18 )  (132 - 151 )/ (71 - 85 )    (99% - 100% )      Pain reported at 9/29 16:02: 1 = Mild    Physical Exam Narrative:  ·  Physical Exam:      Constitutional: Well developed, awake/alert/oriented x3, no distress, alert and cooperative  Eyes: PERRL, EOMI, clear sclera  ENMT: mucous membranes moist, no apparent injury, no lesions seen  Head/Neck: Neck supple, no apparent injury, thyroid without mass or tenderness, No JVD, trachea midline, no bruits  Respiratory/Thorax: Patent airways, CTAB, normal breath sounds with good chest expansion, thorax symmetric  Cardiovascular: Regular, rate and rhythm, no murmurs, 2+ equal pulses of the extremities, normal S 1and S 2  Gastrointestinal: Nondistended, soft, non-tender, no rebound tenderness or guarding, no masses palpable, no organomegaly, +BS, no bruits  Musculoskeletal: ROM intact, no joint swelling, normal strength  Extremities: normal extremities, no cyanosis, edema, contusions or wounds, no clubbing  Neurological: alert and oriented x3, intact senses, motor, response and reflexes, normal strength  Lymphatic: No significant lymphadenopathy  Psychological: Appropriate mood and behavior  Skin: Warm and dry, no lesions, no rashes      Medications:    Medications:          Continuous Medications       --------------------------------  No continuous medications are active       Scheduled Medications       --------------------------------    1. amLODIPine (NORVASC):  5  mg  Oral  Daily    2. Aspirin Chewable:  81  mg  Oral  Daily    3. Atenolol:  37.5  mg  Oral  Daily    4. Atorvastatin:   80  mg  Oral  Daily    5. Budesonide 0.5 mg/ 2 mL Nebulizer Soln:  2  mL  Inhalation  Every 12 Hours    6. Fluticasone 50 microgram/ Nasal Inhalation:  1  spray(s)  Each Nostril  Daily    7. Formoterol 20 microgram/ 2 mL Neb Soln:  2  mL  Inhalation  Every 12 Hours    8. guaiFENesin Extended Release:  600  mg  Oral  Every 12 Hours    9. Isosorbide Mononitrate Extended Release:  30  mg  Oral  Daily    10. Latanoprost 0.005% Ophthalmic:  1  drop(s)  Both Eyes  At Bedtime    11. Magnesium Oxide:  400  mg  Oral  Daily    12. Multivitamin with Minerals:  1  tablet(s)  Oral  Daily    13. Nortriptyline:  25  mg  Oral  At Bedtime    14. Ranolazine:  1000  mg  Oral  2 Times a Day    15. Rivaroxaban:  2.5  mg  Oral  2 Times a Day    16. Valsartan:  160  mg  Oral  2 Times a Day         PRN Medications       --------------------------------    1. Albuterol 2.5 mg/ 3 mL Nebulizer Soln:  3  mL  Inhalation  Every 4 Hours    2. Nitroglycerin SubLingual:  0.4  mg  SubLingual  Every 5 Minutes    3. Sodium Chloride 0.9% Injectable Flush:  10  mL  IntraVenous Flush  Every 8 Hours and as Needed         Conditional Medication Orders       --------------------------------    1. Perflutren Lipid Microsphere (Activated) 1.3 mL / NaCL 0.9% T.V. 10 mL Injectable:  0.5  mL  IntraVenous Push  Once      Recent Lab Results:    Results:    CBC: 9/29/2023 16:35              \     Hgb     /                              \     13.6       /  WBC  ----------------  Plt               5.2       ----------------    235              /     Hct     \                              /     42.7       \            RBC: 4.87     MCV: 88     Neutrophil %: 55.3      BMP: 9/29/2023 16:35  NA+        Cl-     BUN  /                         140    107    17  /  --------------------------------  Glucose                ---------------------------  85    K+     HCO3-   Creat \                         4.6  27    0.86  \  Calcium : 9.3     Anion Gap : 11      CMP:  9/29/2023 13:05  NA+        Cl-     BUN  /                         140    105    18  /  --------------------------------  Glucose                ---------------------------  94    K+     HCO3-   Creat \                         3.8    30    0.90  \           \  T Bili  /                    \  0.9  /  AST  x ---- x ALT        19 x ---- x 22         /  Alk P   \               /  56  \  Calcium : 9.3     Anion Gap : 9 L    Albumin : 4.4     T Protein : 7.4           Coagulation: null  PT  /                              /  -------<    INR          ----------<                PTT\                              \                       Radiology Results:    Results:        Impression:    No detectable active cardiopulmonary disease.                    Signed by Richard Robles MD     Xray Chest 1 View [Sep 29 2023  2:00PM]      Assessment and Plan:   Assessment:    62-year-old gentleman with known history of CAD, hypertension, hyperlipidemia who presented to the hospital with an episode of chest pain and leg weakness, his ACS  has essentially been ruled out at this point, appreciate cardiology consultation, patient will be observed overnight, will have echocardiogram in the morning to assess for any wall motion abnormalities and will likely be a candidate for discharge to home  to follow-up with his outpatient cardiologist to determine if further testing with an ischemic evaluation to stress testing would be warranted at that point.    #Chest pain, atypical  #History of CAD status post PCI  #History of hypertension and hyperlipidemia    Plan:  - Observation with telemetry  - Home medication reconciliation has been completed  - Appreciate cardiology consultation  - Echocardiogram in the morning, likely to be discharged with outpatient cardiology follow-up    Diet: Cardiac, n.p.o. at midnight and till ACS has been officially ruled out  DVT PPx: SCDs, DOAC  Code: Full    Certification: Observation    Kalin Rodriguez MD    [facility]  "[facility]  Internal Medicine    This document was generated in whole or in part using the Dragon One medical voice recognition software and there may be some incorrect words/wording, spelling, or punctuation errors that were not corrected prior to finalization in the medical record.      Electronic Signatures:  Kalin Rodriguez)  (Signed 29-Sep-2023 21:04)   Authored: History of Present Illness, Comorbidities,  Social History, Allergies, Medications Prior to Admission, Objective, Assessment and Plan, Note Completion      Last Updated: 29-Sep-2023 21:04 by Kalin Rodriguez)    References:  1.  Data Referenced From \"Patient Profile - Adult v2\" 29-Sep-2023 16:07   "

## 2023-09-30 NOTE — DISCHARGE INSTRUCTIONS
"  #1  It was a pleasure to see you in the hospital  #2  You were having a lot of personal stress and came to the hospital with chest pain, although you had a \"negative ACS work-up\" which is equivalent of saying you did not have any damage to your heart and that the pain you are experiencing did not come from your heart starving for any blood flow  #3 You had minor changes to your home medications, including a dose reduction in atenolol and increase in imdur  #4  You should follow-up with Dr. Benjamín Rodriguez for consideration of stress testing  "

## 2023-09-30 NOTE — DISCHARGE SUMMARY
"Discharge Diagnosis  Chest pain due to psychological stress    Issues Requiring Follow-Up    #1  It was a pleasure to see you in the hospital  #2  You were having a lot of personal stress and came to the hospital with chest pain, although you had a \"negative ACS work-up\" which is equivalent of saying you did not have any damage to your heart and that the pain you are experiencing did not come from your heart starving for any blood flow  #3 You had minor changes to your home medications, including a dose reduction in atenolol and discontinuation of imdur  #4  You should follow-up with Dr. Benjamín Rodriguez for consideration of stress testing    Discharge Meds     Your medication list        CHANGE how you take these medications        Instructions Last Dose Given Next Dose Due   atenolol 25 mg tablet  Commonly known as: Tenormin  What changed: how much to take      Take 1 tablet (25 mg) by mouth once daily.              CONTINUE taking these medications        Instructions Last Dose Given Next Dose Due   albuterol 90 mcg/actuation inhaler           amLODIPine 5 mg tablet  Commonly known as: Norvasc           aspirin 81 mg EC tablet           atorvastatin 80 mg tablet  Commonly known as: Lipitor           coQ10 (ubiquinol) 100 mg capsule           fluticasone 50 mcg/actuation nasal spray  Commonly known as: Flonase           fluticasone propion-salmeteroL 250-50 mcg/dose diskus inhaler  Commonly known as: Advair Diskus           guaiFENesin 400 mg tablet  Commonly known as: Humibid 3           guaiFENesin 400 mg tablet  Commonly known as: Humibid 3           isosorbide mononitrate ER 60 mg 24 hr tablet  Commonly known as: Imdur      Take 1 tablet (60 mg) by mouth once daily.       latanoprost 0.005 % ophthalmic solution  Commonly known as: Xalatan           magnesium 200 mg tablet           multivitamin with minerals tablet           nortriptyline 10 mg capsule  Commonly known as: Pamelor      Take 3 capsules (30 mg) by " mouth once daily at bedtime.       ranolazine 1,000 mg 12 hr tablet  Commonly known as: Ranexa           rivaroxaban 2.5 mg tablet  Commonly known as: Xarelto           triamcinolone 55 mcg nasal inhaler  Commonly known as: Nasacort           TURMERIC ORAL           valsartan 160 mg tablet  Commonly known as: Diovan                     Where to Get Your Medications        These medications were sent to [id number]  [place], [place] - [address] [facility]  [address] [facility], [facility] [address]      Phone: [phone]   atenolol 25 mg tablet  isosorbide mononitrate ER 60 mg 24 hr tablet         Test Results Pending At Discharge  Pending Labs       No current pending labs.            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, he also has had stress at home as he is helping to raise his grandchild and he feels like he has been getting very poor quality sleep the past several weeks in particular.  Since his last appointment with Dr. Benjamín Rodriguez, he had reduced his Imdur dose and has since been having occasional episodes of chest discomfort, he was having to be earlier in the week, the episode that happened earlier in the day that prompted him to come to the emergency department was also associated with a feeling of fatigue as well as diaphoresis, although there was no other symptoms at that time, given his recent stress he decided that he should come into the emergency department for full evaluation; he did try going to work earlier in the day but  the symptoms did not seem to resolved.    In the emergency department, heart rate 60, respiratory rate 18, SPO2 100% on ambient air, blood pressure 132/71; labs did show that there were normal blood counts, metabolic panel, high sensitivity troponin with minimal elevation and essentially flat, ECG was reviewed and showed a normal sinus rhythm without ST or T wave abnormalities; the patient was administered the remainder of a full dose aspirin, he was admitted to the hospitalist service to be kept under observation for cardiology consultation.    Patient was evaluated with assistance of general cardiology consultation, he was determined to be an appropriate candidate for discharge after no major changes to an echocardiogram were witnessed; the patient was determined to have a negative ACS rule out and his symptoms were more likely related to having stress due to chest pain related to a lot of exorbitant work stress and life stress recently; the patient had atenolol dose mildly decreased from 37.5 mg down to 25 mg as well as discontinuation of Imdur, he will follow-up with his cardiologist in the next week to consider stress testing on an outpatient basis.    Greater than 30 minutes was spent facilitating this patients discharge from the hospital which included examining the patient, reconciling medications, and making arrangements for future care.    Kalin Rodriguez MD  [facility]  Internal Medicine    This document was generated in whole or in part using the Dragon One medical voice recognition software and there may be some incorrect words/wording, spelling, or punctuation errors that were not corrected prior to finalization in the medical record.    Pertinent Physical Exam At Time of Discharge  Physical Exam    Outpatient Follow-Up  Future Appointments   Date Time Provider Department [place]   12/6/2023  9:00 AM Wilver Mccall DO [id number] [place]   12/12/2023  8:45 AM Benjamín Rodriguez MD  [id number] [place]   1/26/2024  3:45 PM Farideh Sweet MD [id number] [place]         Kalin Rodriguez MD

## 2023-09-30 NOTE — PROGRESS NOTES
Subjective Data:  See my consult in  [employer] system from yesterday patient of Dr. Benjamín Rodriguez I texted and communicated with him last night patient had previous LAD PCI OM PCI and has known RCA  with collaterals to the right presented with some anxiety stress chest discomfort somewhat similar to previous symptoms prior to PCI EKG enzymes negative no further episodes has been under great stress stress recently.  Enzymes have been negative EKG is unchanged no clinical arrhythmias or further symptoms his echocardiogram is completely normal I have recommended an outpatient functional cardiac study with imaging under the care of Dr. Rodriguez he may be discharge      Echocardiogram    CONCLUSIONS:   1. Left ventricular systolic function is normal with a 55-60% estimated ejection fraction.   2. RVSP within normal limits.     QUANTITATIVE DATA SUMMARY:  2D MEASUREMENTS:                           Normal Ranges:  LAs:           3.30 cm   (2.7-4.0cm)  IVSd:          1.06 cm   (0.6-1.1cm)  LVPWd:         1.02 cm   (0.6-1.1cm)  LVIDd:         4.39 cm   (3.9-5.9cm)  LVIDs:         3.26 cm  LV Mass Index: 83.2 g/m2  LV % FS        25.7 %     LA VOLUME:                              Normal Ranges:  LA Area A4C:     10.3 cm2  LA Area A2C:     13.9 cm2  LA Volume Index: 14.0 ml/m2  LA Vol A4C:      19.0 ml  LA Vol A2C:      31.0 ml  LA Vol BP:       26.0 ml     M-MODE MEASUREMENTS:                   Normal Ranges:  Ao Root: 3.10 cm (2.0-3.7cm)  AoV Exc: 2.40 cm (1.5-2.5cm)     AORTA MEASUREMENTS:                     Normal Ranges:  AoV Exc:   2.40 cm (1.5-2.5cm)  Asc Ao, d: 3.70 cm (2.1-3.4cm)     LV SYSTOLIC FUNCTION BY 2D PLANIMETRY (MOD):                      Normal Ranges:  EF-A4C View: 54.5 % (>=55%)  EF-A2C View: 59.1 %  EF-Biplane:  54.8 %          Overnight Events:    No new clinical developments     Objective Data:  Last Recorded Vitals:  Vitals:    09/29/23 2200 09/30/23 0800 09/30/23 0954 09/30/23 1200   BP:   "116/60  97/54   BP Location:  Left arm  Left arm   Patient Position:  Sitting  Sitting   Pulse:  57 64 71   Resp:  16 16 16   Temp:  36.1 °C (97 °F)  36.3 °C (97.3 °F)   SpO2:  100%  99%   Weight: 70 kg (154 lb 5.2 oz)      Height: 1.802 m (5' 10.95\")          Last Labs:  CBC - 9/30/2023: 12:44 PM  4.1 13.5 247    42.5      CMP - 9/30/2023:  7:47 AM  9.3 7.4 19 --- 0.9   _ 4.4 22 56      PTT - No results in last year.  _   _ _     TROPHS   Date/Time Value Ref Range Status   09/30/2023 07:47 AM 6 0 - 20 ng/L Final   09/29/2023 06:41 PM 7 0 - 20 ng/L Final     Comment:     .  Less than 99th percentile of normal range cutoff-  Female and children under 18 years old <14 ng/L; Male <21 ng/L: Negative  Repeat testing should be performed if clinically indicated.   .  Female and children under 18 years old 14-50 ng/L; Male 21-50 ng/L:  Consistent with possible cardiac damage and possible increased clinical   risk. Serial measurements may help to assess extent of myocardial damage.   .  >50 ng/L: Consistent with cardiac damage, increased clinical risk and  myocardial infarction. Serial measurements may help assess extent of   myocardial damage.   .   NOTE: Children less than 1 year old may have higher baseline troponin   levels and results should be interpreted in conjunction with the overall   clinical context.   .  NOTE: Troponin I testing is performed using a different   testing methodology at [facility] than at other   [facility]. Direct result comparisons should only   be made within the same method.     09/29/2023 04:35 PM 6 0 - 20 ng/L Final     Comment:     .  Less than 99th percentile of normal range cutoff-  Female and children under 18 years old <14 ng/L; Male <21 ng/L: Negative  Repeat testing should be performed if clinically indicated.   .  Female and children under 18 years old 14-50 ng/L; Male 21-50 ng/L:  Consistent with possible cardiac damage and possible increased clinical   risk. " Serial measurements may help to assess extent of myocardial damage.   .  >50 ng/L: Consistent with cardiac damage, increased clinical risk and  myocardial infarction. Serial measurements may help assess extent of   myocardial damage.   .   NOTE: Children less than 1 year old may have higher baseline troponin   levels and results should be interpreted in conjunction with the overall   clinical context.   .  NOTE: Troponin I testing is performed using a different   testing methodology at [facility] than at other   system hospitals. Direct result comparisons should only   be made within the same method.     09/29/2023 01:05 PM 3 0 - 20 ng/L Final     Comment:     .  Less than 99th percentile of normal range cutoff-  Female and children under 18 years old <14 ng/L; Male <21 ng/L: Negative  Repeat testing should be performed if clinically indicated.   .  Female and children under 18 years old 14-50 ng/L; Male 21-50 ng/L:  Consistent with possible cardiac damage and possible increased clinical   risk. Serial measurements may help to assess extent of myocardial damage.   .  >50 ng/L: Consistent with cardiac damage, increased clinical risk and  myocardial infarction. Serial measurements may help assess extent of   myocardial damage.   .   NOTE: Children less than 1 year old may have higher baseline troponin   levels and results should be interpreted in conjunction with the overall   clinical context.   .  NOTE: Troponin I testing is performed using a different   testing methodology at [facility] than at other   [facility] hospitals. Direct result comparisons should only   be made within the same method.       BNP   Date/Time Value Ref Range Status   04/30/2020 01:39 PM 69 0 - 99 pg/mL Final     Comment:     .  <100 pg/mL - Heart failure unlikely  100-299 pg/mL - Intermediate probability of acute heart  .               failure exacerbation. Correlate with clinical  .               context and patient  "history.    >=300 pg/mL - Heart Failure likely. Correlate with clinical  .               context and patient history.  BNP testing is performed using different testing   methodology at [facility] than at other   [facility] hospitals. Direct result comparisons should   only be made within the same method.       HGBA1C   Date/Time Value Ref Range Status   05/01/2020 05:36 AM 5.2 % Final     Comment:          Diagnosis of Diabetes-Adults   Non-Diabetic: < or = 5.6%   Increased risk for developing diabetes: 5.7-6.4%   Diagnostic of diabetes: > or = 6.5%  .       Monitoring of Diabetes                Age (y)     Therapeutic Goal (%)   Adults:          >18           <7.0   Pediatrics:    13-18           <7.5                   7-12           <8.0                   0- 6            7.5-8.5   American Diabetes Association. Diabetes Care 33(S1), Jan 2010.     11/21/2019 07:53 AM 5.3 % Final     Comment:          Diagnosis of Diabetes-Adults   Non-Diabetic: < or = 5.6%   Increased risk for developing diabetes: 5.7-6.4%   Diagnostic of diabetes: > or = 6.5%  .       Monitoring of Diabetes                Age (y)     Therapeutic Goal (%)   Adults:          >18           <7.0   Pediatrics:    13-18           <7.5                   7-12           <8.0                   0- 6            7.5-8.5   American Diabetes Association. Diabetes Care 33(S1), Jan 2010.       VLDL   Date/Time Value Ref Range Status   07/20/2023 07:38 AM 19 0 - 40 mg/dL Final   07/01/2022 07:29 AM 18 0 - 40 mg/dL Final   05/05/2021 07:36 AM 19 0 - 40 mg/dL Final      Last I/O:  No intake/output data recorded.    Past Cardiology Tests (Last 3 Years):  EKG:  No results found for this or any previous visit from the past 1095 days.    Echo:  Transthoracic Echo (TTE) Complete 9/30/2023    Ejection Fractions:  No results found for: \"EF\"  Cath:  No results found for this or any previous visit from the past 1095 days.    Stress Test:  No results found for this " or any previous visit from the past 1095 days.    Cardiac Imaging:  No results found for this or any previous visit from the past 1095 days.      Inpatient Medications:  Scheduled medications   Medication Dose Route Frequency    amLODIPine  5 mg oral Daily    aspirin  81 mg oral Daily    atenolol  37.5 mg oral Daily    atorvastatin  80 mg oral Daily    budesonide  0.5 mg nebulization q12h    fluticasone  1 spray Each Nostril Daily    formoterol  20 mcg nebulization q12h    guaiFENesin  600 mg oral BID    isosorbide mononitrate ER  30 mg oral Daily    latanoprost  1 drop Both Eyes Nightly    magnesium oxide  400 mg oral Daily    multivitamin with minerals  1 tablet oral Daily    nortriptyline  25 mg oral Nightly    ranolazine  1,000 mg oral BID    rivaroxaban  2.5 mg oral BID with meals    valsartan  160 mg oral q12h DEE     PRN medications   Medication    albuterol    nitroglycerin    sodium chloride 0.9%     Continuous Medications   Medication Dose Last Rate       Physical Exam:  bjective:     Examination:   General Examination:   General Appearance: Well developed, well nourished, in no acute distress.   Head: normocephalic, atraumatic   Eyes: Anicteric sclera. Pupils are equally round and reactive to light.  Extraocular movements are intact.    Ears: normal   Oral: Cavity: mucosa moist.   Throat: clear.   Neck/Thyroid: neck supple, full range of motion, no cervical lymphadenopathy.   Skin: warm and dry, no suspicious lesions.    Heart: regular rate and rhythm, S1, S2 normal, no murmurs.   Lungs: clear to auscultation bilaterally.   Abdomen: soft, non-tender, non-distended, bowl sound present, normal.   Extremities: no clubbing, no cyanosis, no edema.   Neuro: non-focal, motor strength normal upper lower extremities, sensory exam intact.       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        Doron Kaur MD

## 2023-10-02 PROCEDURE — 99285 EMERGENCY DEPT VISIT HI MDM: CPT

## 2023-10-02 PROCEDURE — 9990 CHARGE CONVERSION

## 2023-10-03 DIAGNOSIS — I20.0 ANGINA PECTORIS, UNSTABLE (MULTI): ICD-10-CM

## 2023-10-03 DIAGNOSIS — I25.10 ATHSCL HEART DISEASE OF NATIVE CORONARY ARTERY W/O ANG PCTRS: Primary | ICD-10-CM

## 2023-10-03 LAB
ERYTHROCYTE DISTRIBUTION WIDTH (RATIO) BY AUTOMATED COUNT: NORMAL
ERYTHROCYTE MEAN CORPUSCULAR HEMOGLOBIN CONCENTRATION (G/DL) BY AUTOMATED: NORMAL
ERYTHROCYTE MEAN CORPUSCULAR VOLUME (FL) BY AUTOMATED COUNT: NORMAL
ERYTHROCYTES (10*6/UL) IN BLOOD BY AUTOMATED COUNT: NORMAL
HEMATOCRIT (%) IN BLOOD BY AUTOMATED COUNT: NORMAL
HEMOGLOBIN (G/DL) IN BLOOD: NORMAL
LEUKOCYTES (10*3/UL) IN BLOOD BY AUTOMATED COUNT: NORMAL
NRBC (PER 100 WBCS) BY AUTOMATED COUNT: NORMAL
PLATELETS (10*3/UL) IN BLOOD AUTOMATED COUNT: NORMAL

## 2023-10-04 LAB
ANION GAP IN SER/PLAS: NORMAL
CALCIUM (MG/DL) IN SER/PLAS: NORMAL
CARBON DIOXIDE, TOTAL (MMOL/L) IN SER/PLAS: NORMAL
CHLORIDE (MMOL/L) IN SER/PLAS: NORMAL
CREATININE (MG/DL) IN SER/PLAS: NORMAL
GFR FEMALE: NORMAL
GFR MALE: NORMAL
GLOMERULAR FILTRATION RATE-AFRICAN AMERICAN: NORMAL ML/MIN/1.73M2
GLOMERULAR FILTRATION RATE-NON AFRICAN AMERICAN: NORMAL ML/MIN/1.73M2
GLUCOSE (MG/DL) IN SER/PLAS: NORMAL
MAGNESIUM (MG/DL) IN SER/PLAS: NORMAL
POTASSIUM (MMOL/L) IN SER/PLAS: NORMAL
SODIUM (MMOL/L) IN SER/PLAS: NORMAL
TROPONIN I, HIGH SENSITIVITY: NORMAL
UREA NITROGEN (MG/DL) IN SER/PLAS: NORMAL

## 2023-10-12 NOTE — CONSULTS
"    Service:   Service: Cardiology     Consult:  Consult requested by (Attending Name): Kalin Rodriguez   Reason: History of CAD with PCI three years prior,  known Rigth ; follows with Dr. Benjamní Rodriguez.  Presents with typical chest pain symptom associated with diaphoresis and legs giving out, ACS negative so far with no ECG change.  Requesting cardiology consult to determine if ischemic workup needed     History of Present Illness:   HPI:    LINKSUNNY LOUIE is a 62 year old Male.  Patient of Dr. Benjamín Rodriguez with history of PCI known RCA .  Presented with chest pain weakness legs giving  out.  No significant EKG changes or enzyme elevations.  Patient states in the ED that he was \"overworking himself\" for the past several weeks fatigue difficulty getting out of bed going down the stairs states his legs buckled out from underneath him no  history of PE or DVT.  EKG showed sinus rhythm fi rst-degree AV block heart rate 61 troponins D-dimer negative chest x-ray unremarkable. medications include albuterol Advair aspirin multivitamin tumeric, Xarelto 2.5 twice daily valsartan 160 twice  daily, Imdur 60 daily, amlodipine 5 daily, atenolol 37.5 daily, atorvastatin 80 daily, Eliquis all 10 2-3 tabs nightly magnesium daily every 10 200 mg daily Flonase sublingual nitroglycerin as needed he did describe some brief similar left arm numbness  heaviness like he had prior to his PCI on 1 occasion but no other similar symptoms he is under stress with job managing hospice and with 1 grandson and son living in the house assuming echo and troponins are negative outpatient nuclear stress test would  be appropriate I communicated his admission and findings to his cardiologist Dr. Benjamín Rodriguez via doc halo    Office records from Dr. Rodriguez state history of PCI to LAD left circumflex May 1, 2020 RCA  at that time treated medically LAD stented with resolute O NY X 3 x 22 drug-eluting stent and 3 x 8 drug-eluting stent " PCI OM1 with resolute ON my asked 2.5  x 22 stent HAVEN RCA  with left-to-right collaterals EF 60-65 also states hypertension orthostatic hypotension PSA stable angina stressful life and chronic anxiety state asthma anxiety rhinitis    Family history includes glaucoma hypertension in mother father with cardiac disorder cancer bypass Brother with hypertension maternal grandmother with CVA glaucoma hypertension MI bypass paternal grandfather cardiac disorder and cardiac disorder paternal  uncle with cardiac disorder neoplasm    Past medical history coronary disease status post stent 3 to 4 years ago hypertension dyslipidemia  Past surgical history PCI with cardiac stenting  Social history negative for smoking alcohol or illicit drug use    Past Medical/Surgical History:        Medical History:   Dyslipidemia:    CAD (coronary artery disease):     Review Family/Social History and ROS:   Social History:    Smoking Status: never smoker  (1)   Alcohol Use: occasionally (1)   Drug Use: denies  (1)     Constitutional: POSITIVE: Malaise; NEGATIVE: Fever,  Chills, Anorexia, Weight Loss     Eyes: NEGATIVE: Blurry Vision, Drainage, Diploplia,  Redness, Vision Loss/ Change     ENMT: NEGATIVE: Nasal Discharge, Nasal Congestion,  Ear Pain, Mouth Pain, Throat Pain     Respiratory: NEGATIVE: Dry Cough, Productive Cough,  Hemoptysis, Wheezing, Shortness of Breath     Cardiac: POSITIVE: Chest Pain; NEGATIVE: Dyspnea  on Exertion, Orthopnea, Palpitations, Syncope     Gastrointestinal: NEGATIVE: Nausea, Vomiting, Diarrhea,  Constipation, Abdominal Pain     Genitourinary: NEGATIVE: Discharge, Dysuria, Flank  Pain, Frequency, Hematuria     Musculoskeletal: NEGATIVE: Decreased ROM, Pain, Swelling,  Stiffness, Weakness     Neurological: NEGATIVE: Dizziness, Confusion, Headache,  Seizures, Syncope     Psychiatric: NEGATIVE: Mood Changes, Anxiety, Hallucinations,  Sleep Changes, Suicidal Ideas     Skin: NEGATIVE: Mass, Pain, Pruritus, Rash,  Ulcer     Endocrine: NEGATIVE: Heat Intolerance, Cold Intolerance,  Sweat, Polyuria, Thirst     Hematologic/Lymph: NEGATIVE: Anemia, Bruising, Easy  Bleeding, Night Sweats, Petechiae     Allergic/Immunologic: NEGATIVE: Anaphylaxis, Itchy/  Teary Eyes, Itching, Sneezing, Swelling              Allergies:  ·  NKDA :   ·  IVP  DYE: Anaphylaxis    Objective:     Objective Information:        T   P  R  BP   MAP  SpO2   Value  35.8  60  18  151/85      100%  Date/Time 9/29 15:50 9/29 15:50 9/29 15:50 9/29 15:50    9/29 15:50  Range  (35.8C - 36.3C )  (58 - 60 )  (18 - 18 )  (132 - 151 )/ (71 - 85 )    (99% - 100% )        Pain reported at 9/29 16:02: 1 = Mild         Weights   9/29 16:07: Weight in kg (Weight (kg))  70  9/29 16:07: Weight in lbs ((lbs))  154.3  9/29 16:07: BMI (kg/m2) (BMI (kg/m2))  21.557    Physical Exam by System:    Constitutional: 's obvious rash peripheral embolic  findings signs of leg edema vasculitis mass or adenopathy   Eyes: Skin jaundice pupils are equal and symmetric   ENMT: no abnormalities on ENT exam   Head/Neck: Thyroid mass or bruits no cervical adenopathy  no JVP no carotid bruits   Respiratory/Thorax: Some are clear no effusion no  EKG changes no rales or wheezing bilateral equal symmetric   Cardiovascular: Normal S2 no aortic valve stenosis  or insufficiency murmur no mitral or tricuspid valve murmurs no S3 or S4 gallops no pericardial rubs   Gastrointestinal: Spelled abdominal pulsatile mass  no hepatomegaly or splenomegaly no renal or mesenteric bruits   Genitourinary: No Discharge, vesicles or other abnormalities   Musculoskeletal: C no DVT or leg swelling Homans  sign negative no peripheral embolic findings normal distal pulses   Extremities: See full range of motion no unilateral  leg swelling skin warm and dry no evidence of DVT on exam   Neurological: Formal gait no ataxia no visual field  deficits cranial nerve exam normal motor strength equal symmetric reflexes normal no  tremor   Breast: 32 primary provider-hospitalist   Lymphatic: C no sign of lymphedema   Psychological: Oriented ×3 no sign of psychosis  agitation or obvious impairment   Skin: Skin warm dry no cyanosis no peripheral embolic  findings to suggest embolic events no evidence of DVT     Medications:    Medications:          Continuous Medications       --------------------------------  No continuous medications are active       Scheduled Medications       --------------------------------    1. amLODIPine (NORVASC):  5  mg  Oral  Daily    2. Aspirin Chewable:  81  mg  Oral  Daily    3. Atenolol:  37.5  mg  Oral  Daily    4. Atorvastatin:  80  mg  Oral  Daily    5. Budesonide 0.5 mg/ 2 mL Nebulizer Soln:  2  mL  Inhalation  Every 12 Hours    6. Fluticasone 50 microgram/ Nasal Inhalation:  1  spray(s)  Each Nostril  Daily    7. Formoterol 20 microgram/ 2 mL Neb Soln:  2  mL  Inhalation  Every 12 Hours    8. guaiFENesin Extended Release:  600  mg  Oral  Every 12 Hours    9. Isosorbide Mononitrate Extended Release:  30  mg  Oral  Daily    10. Latanoprost 0.005% Ophthalmic:  1  drop(s)  Both Eyes  At Bedtime    11. Magnesium Oxide:  400  mg  Oral  Daily    12. Multivitamin with Minerals:  1  tablet(s)  Oral  Daily    13. Nortriptyline:  25  mg  Oral  At Bedtime    14. Ranolazine:  1000  mg  Oral  2 Times a Day    15. Rivaroxaban:  2.5  mg  Oral  2 Times a Day    16. Valsartan:  160  mg  Oral  2 Times a Day         PRN Medications       --------------------------------    1. Albuterol 2.5 mg/ 3 mL Nebulizer Soln:  3  mL  Inhalation  Every 4 Hours    2. Nitroglycerin SubLingual:  0.4  mg  SubLingual  Every 5 Minutes    3. Sodium Chloride 0.9% Injectable Flush:  10  mL  IntraVenous Flush  Every 8 Hours and as Needed        Recent Lab Results:    Results:        I have reviewed these laboratory results:    Troponin I, High Sensitivity  Trending View      Result 29-Sep-2023 18:41:00  29-Sep-2023 16:35:00  29-Sep-2023 13:05:00     Troponin I, High Sensitivity 7   6   3        Basic Metabolic Panel  29-Sep-2023 16:35:00      Result Value    Glucose, Serum  85    NA  140    K  4.6    CL  107    Bicarbonate, Serum  27    Anion Gap, Serum  11    BUN  17    CREAT  0.86    GFR Male  >90    Calcium, Serum  9.3      Complete Blood Count  29-Sep-2023 16:35:00      Result Value    White Blood Cell Count  5.2    Nucleated Erythrocyte Count  0.0    Red Blood Cell Count  4.87    HGB  13.6    HCT  42.7    MCV  88    MCHC  31.9   L   PLT  235    RDW-CV  13.2      Magnesium, Serum  29-Sep-2023 16:35:00      Result Value    Magnesium, Serum  2.11      Coronavirus 2019 by PCR  29-Sep-2023 15:55:00      Result Value    Fluid Source  Nasal, Nasopharyngeal    Coronavirus 2019,PCR  NOT DETECTED  Reference Range: Not Detected .This test has received FDA Emergency Use Authorization (EUA) and has been verified by [facility]. This test is only authorized for the duration of time that circums      Complete Blood Count + Differential  29-Sep-2023 13:05:00      Result Value    White Blood Cell Count  5.1    Nucleated Erythrocyte Count  0.0    Red Blood Cell Count  4.81    HGB  13.4   L   HCT  42.3    MCV  88    MCHC  31.7   L   PLT  238    RDW-CV  13.2    Neutrophil %  55.3    Immature Granulocytes %  0.2    Lymphocyte %  27.2    Monocyte %  11.8    Eosinophil %  4.9    Basophil %  0.6    Neutrophil Count  2.80    Lymphocyte Count  1.38    Monocyte Count  0.60    Eosinophil Count  0.25    Basophil Count  0.03      Comprehensive Metabolic Panel  29-Sep-2023 13:05:00      Result Value    Glucose, Serum  94    NA  140    K  3.8    CL  105    Bicarbonate, Serum  30    Anion Gap, Serum  9   L   BUN  18    CREAT  0.90    GFR Male  >90    Calcium, Serum  9.3    ALB  4.4    ALKP  56    T Pro  7.4    T Bili  0.9    Alanine Aminotransferase, Serum  22    Aspartate Transaminase, Serum  19      D-Dimer, VTE Exclusion  29-Sep-2023 13:05:00   "    Result Value    D-Dimer, VTE Exclusion  <215        Radiology Results:    Results:        Impression:    No detectable active cardiopulmonary disease.                    Signed by Richard Robles MD     Xray Chest 1 View [Sep 29 2023  2:00PM]        Assessment:    Based on my review of the cardiac catheterization data and all of the abundant office notes from his cardiologist Dr. Benjamín Rodriguez I do not see evidence of an acute  ACS event I will do serial enzymes continue his evidence-based medicine order an echocardiogram for the a.m. assuming the echo shows no wall motion changes and he has no further events enzyme elevations arrhythmias or EKG changes he could have outpatient  follow-up with Dr. Rodriguez who can decide whether to order noninvasive nuclear stress test and or leg exercise PVR studies for PAD he does have a high level of stress and anxiety which may be contributing to his presentation    Plan of Care Reviewed With:  Plan of Care Reviewed With: patient     Consult Status:  Consult Status    (select all that apply): initial  consult complete, will follow   Consult Order ID: [id number]       Electronic Signatures:  Doron Kaur)  (Signed 29-Sep-2023 21:11)   Authored: Service, History of Present Illness, Past Medical/Surgical  History, Review Family/Social History and ROS, Allergies, Objective, Assessment/Recommendations, Note Completion      Last Updated: 29-Sep-2023 21:11 by Doron Kaur)    References:  1.  Data Referenced From \"Patient Profile - Adult v2\" 29-Sep-2023 16:07   "

## 2023-10-16 ENCOUNTER — HOSPITAL ENCOUNTER (OUTPATIENT)
Dept: RADIOLOGY | Facility: HOSPITAL | Age: 63
Discharge: HOME | End: 2023-10-16
Payer: COMMERCIAL

## 2023-10-16 ENCOUNTER — HOSPITAL ENCOUNTER (OUTPATIENT)
Dept: RADIOLOGY | Facility: HOSPITAL | Age: 63
End: 2023-10-16
Payer: COMMERCIAL

## 2023-10-16 ENCOUNTER — HOSPITAL ENCOUNTER (OUTPATIENT)
Dept: CARDIOLOGY | Facility: HOSPITAL | Age: 63
Discharge: HOME | End: 2023-10-16
Payer: COMMERCIAL

## 2023-10-16 DIAGNOSIS — I25.10 ATHSCL HEART DISEASE OF NATIVE CORONARY ARTERY W/O ANG PCTRS: ICD-10-CM

## 2023-10-16 DIAGNOSIS — I20.0 ANGINA PECTORIS, UNSTABLE (MULTI): ICD-10-CM

## 2023-10-16 DIAGNOSIS — R07.89 OTHER CHEST PAIN: ICD-10-CM

## 2023-10-16 PROCEDURE — 3430000001 HC RX 343 DIAGNOSTIC RADIOPHARMACEUTICALS: Performed by: INTERNAL MEDICINE

## 2023-10-16 PROCEDURE — A9502 TC99M TETROFOSMIN: HCPCS | Performed by: INTERNAL MEDICINE

## 2023-10-16 PROCEDURE — 93017 CV STRESS TEST TRACING ONLY: CPT

## 2023-10-16 PROCEDURE — 78452 HT MUSCLE IMAGE SPECT MULT: CPT | Performed by: INTERNAL MEDICINE

## 2023-10-16 PROCEDURE — 78452 HT MUSCLE IMAGE SPECT MULT: CPT

## 2023-10-16 PROCEDURE — 93016 CV STRESS TEST SUPVJ ONLY: CPT | Performed by: INTERNAL MEDICINE

## 2023-10-16 RX ADMIN — TETROFOSMIN 10.1 MILLICURIE: 0.23 INJECTION, POWDER, LYOPHILIZED, FOR SOLUTION INTRAVENOUS at 11:55

## 2023-10-16 RX ADMIN — TETROFOSMIN 30.7 MILLICURIE: 0.23 INJECTION, POWDER, LYOPHILIZED, FOR SOLUTION INTRAVENOUS at 15:23

## 2023-10-17 PROCEDURE — 93018 CV STRESS TEST I&R ONLY: CPT | Performed by: INTERNAL MEDICINE

## 2023-10-17 PROCEDURE — 93016 CV STRESS TEST SUPVJ ONLY: CPT | Performed by: INTERNAL MEDICINE

## 2023-11-16 ENCOUNTER — TELEPHONE (OUTPATIENT)
Dept: INFECTIOUS DISEASES | Facility: HOSPITAL | Age: 63
End: 2023-11-16
Payer: COMMERCIAL

## 2023-11-16 DIAGNOSIS — Z71.84 COUNSELING FOR TRAVEL: Primary | ICD-10-CM

## 2023-11-16 NOTE — TELEPHONE ENCOUNTER
Patient called because he is going back to [place] 1/10-1/18. He is looking for a refill of malaria and cipro. You saw him December 2022 at the travel clinic. [facility]     Thanks

## 2023-11-17 RX ORDER — CIPROFLOXACIN 500 MG/1
500 TABLET ORAL 2 TIMES DAILY
Qty: 6 TABLET | Refills: 0 | Status: SHIPPED | OUTPATIENT
Start: 2023-11-17 | End: 2023-11-20

## 2023-11-17 RX ORDER — ATOVAQUONE AND PROGUANIL HYDROCHLORIDE 250; 100 MG/1; MG/1
1 TABLET, FILM COATED ORAL DAILY
Qty: 16 TABLET | Refills: 1 | Status: SHIPPED | OUTPATIENT
Start: 2023-11-17 | End: 2023-12-12 | Stop reason: ALTCHOICE

## 2023-11-24 PROBLEM — I25.110 UNSTABLE ANGINA PECTORIS DUE TO CORONARY ARTERIOSCLEROSIS (MULTI): Status: ACTIVE | Noted: 2023-11-24

## 2023-11-24 RX ORDER — LOPERAMIDE HYDROCHLORIDE 2 MG/1
CAPSULE ORAL
COMMUNITY
Start: 2022-12-13

## 2023-11-24 RX ORDER — PRASTERONE (DHEA) 50 MG
CAPSULE ORAL
COMMUNITY

## 2023-11-24 RX ORDER — MACA
POWDER (GRAM) MISCELLANEOUS
COMMUNITY

## 2023-11-24 RX ORDER — SPIRONOLACTONE 25 MG/1
25 TABLET ORAL DAILY
COMMUNITY
Start: 2023-01-20 | End: 2023-12-12 | Stop reason: ALTCHOICE

## 2023-11-24 RX ORDER — MULTIVITAMIN
TABLET ORAL
COMMUNITY
Start: 2019-10-30

## 2023-11-24 RX ORDER — PARSLEY 450 MG
CAPSULE ORAL
COMMUNITY

## 2023-11-24 RX ORDER — ISOSORBIDE MONONITRATE 60 MG/1
TABLET, EXTENDED RELEASE ORAL
COMMUNITY
Start: 2023-11-02 | End: 2023-12-12 | Stop reason: WASHOUT

## 2023-11-24 RX ORDER — NITROGLYCERIN 0.4 MG/1
TABLET SUBLINGUAL
COMMUNITY
Start: 2020-05-15

## 2023-11-24 RX ORDER — CHLOROQUINE PHOSPHATE 500 MG/1
TABLET, COATED ORAL
COMMUNITY
Start: 2022-12-13

## 2023-12-06 ENCOUNTER — OFFICE VISIT (OUTPATIENT)
Dept: PRIMARY CARE | Facility: CLINIC | Age: 63
End: 2023-12-06
Payer: COMMERCIAL

## 2023-12-06 VITALS
HEART RATE: 53 BPM | RESPIRATION RATE: 14 BRPM | TEMPERATURE: 97.6 F | WEIGHT: 164 LBS | OXYGEN SATURATION: 94 % | DIASTOLIC BLOOD PRESSURE: 80 MMHG | BODY MASS INDEX: 23.48 KG/M2 | HEIGHT: 70 IN | SYSTOLIC BLOOD PRESSURE: 128 MMHG

## 2023-12-06 DIAGNOSIS — I25.10 CORONARY ARTERY DISEASE INVOLVING NATIVE CORONARY ARTERY OF NATIVE HEART WITHOUT ANGINA PECTORIS: ICD-10-CM

## 2023-12-06 DIAGNOSIS — I10 HTN (HYPERTENSION), BENIGN: Primary | ICD-10-CM

## 2023-12-06 DIAGNOSIS — F41.9 ANXIETY: ICD-10-CM

## 2023-12-06 DIAGNOSIS — R42 DIZZY SPELLS: ICD-10-CM

## 2023-12-06 PROCEDURE — 3079F DIAST BP 80-89 MM HG: CPT | Performed by: INTERNAL MEDICINE

## 2023-12-06 PROCEDURE — 99214 OFFICE O/P EST MOD 30 MIN: CPT | Performed by: INTERNAL MEDICINE

## 2023-12-06 PROCEDURE — 3074F SYST BP LT 130 MM HG: CPT | Performed by: INTERNAL MEDICINE

## 2023-12-06 PROCEDURE — 1036F TOBACCO NON-USER: CPT | Performed by: INTERNAL MEDICINE

## 2023-12-06 RX ORDER — SERTRALINE HYDROCHLORIDE 25 MG/1
25 TABLET, FILM COATED ORAL DAILY
Qty: 30 TABLET | Refills: 1 | Status: SHIPPED | OUTPATIENT
Start: 2023-12-06 | End: 2024-01-03 | Stop reason: SDUPTHER

## 2023-12-06 ASSESSMENT — ENCOUNTER SYMPTOMS
NAUSEA: 0
COUGH: 0
PALPITATIONS: 0
ABDOMINAL PAIN: 0
SHORTNESS OF BREATH: 0
DIARRHEA: 0
WHEEZING: 0
HYPERTENSION: 1

## 2023-12-06 NOTE — PROGRESS NOTES
"Subjective   Patient ID: Lokesh Elizondo is a 63 y.o. male who presents for Hypertension.    Syncope back in October with subsequent near syncopal episodes.  Nothing in the past several weeks.  He does admit to being under a lot of stress through work.    He did go to the ER.  We reviewed his results from the hospital including his stress test.      Review of Systems    Objective   /80 (BP Location: Left arm, Patient Position: Sitting, BP Cuff Size: Adult)   Pulse 53   Temp 36.4 °C (97.6 °F) (Tympanic)   Resp 14   Ht 1.778 m (5' 10\")   Wt 74.4 kg (164 lb)   SpO2 94%   BMI 23.53 kg/m²     Physical Exam  Vitals reviewed.   Constitutional:       Appearance: Normal appearance.   HENT:      Head: Normocephalic.   Cardiovascular:      Rate and Rhythm: Normal rate.   Pulmonary:      Effort: Pulmonary effort is normal.   Musculoskeletal:         General: Normal range of motion.   Neurological:      General: No focal deficit present.      Mental Status: He is alert.   Psychiatric:         Mood and Affect: Mood normal.         Assessment/Plan   Problem List Items Addressed This Visit             ICD-10-CM    Anxiety F41.9    Relevant Medications    sertraline (Zoloft) 25 mg tablet    HTN (hypertension), benign - Primary I10     Other Visit Diagnoses         Codes    Dizzy spells     R42    Coronary artery disease involving native coronary artery of native heart without angina pectoris     I25.10        We reviewed and discussed all of the above.    + CAD with negative recent ACS workup.  No recent palpitations or near syncope.  CAD risks are controlled.    We discussed anxiety and potential benefit of SSRI.  He is nervous about potential side effects.  We will start with 12.5 mg Zoloft and slowly titrate up. We also stressed need for good fluids / water intake.    He has a follow up appointment with cardio within the next week.    We will see him back in 2-3 months - sooner if any issues.       "

## 2023-12-06 NOTE — PROGRESS NOTES
"Subjective   Patient ID: Lokesh Elizondo is a 63 y.o. male who presents for Hypertension.    Hypertension  Pertinent negatives include no chest pain, palpitations or shortness of breath.        Review of Systems   Respiratory:  Negative for cough, shortness of breath and wheezing.    Cardiovascular:  Negative for chest pain and palpitations.   Gastrointestinal:  Negative for abdominal pain, diarrhea and nausea.       Objective   /80 (BP Location: Left arm, Patient Position: Sitting, BP Cuff Size: Adult)   Pulse 53   Temp 36.4 °C (97.6 °F) (Tympanic)   Resp 14   Ht 1.778 m (5' 10\")   Wt 74.4 kg (164 lb)   SpO2 94%   BMI 23.53 kg/m²     Physical Exam    Assessment/Plan          "

## 2023-12-12 ENCOUNTER — OFFICE VISIT (OUTPATIENT)
Dept: CARDIOLOGY | Facility: CLINIC | Age: 63
End: 2023-12-12
Payer: COMMERCIAL

## 2023-12-12 VITALS
HEIGHT: 71 IN | DIASTOLIC BLOOD PRESSURE: 76 MMHG | HEART RATE: 65 BPM | SYSTOLIC BLOOD PRESSURE: 124 MMHG | OXYGEN SATURATION: 97 % | WEIGHT: 161 LBS | BODY MASS INDEX: 22.54 KG/M2

## 2023-12-12 DIAGNOSIS — E78.5 DYSLIPIDEMIA: ICD-10-CM

## 2023-12-12 DIAGNOSIS — I25.10 ATHSCL HEART DISEASE OF NATIVE CORONARY ARTERY W/O ANG PCTRS: Primary | ICD-10-CM

## 2023-12-12 DIAGNOSIS — I10 HTN (HYPERTENSION), BENIGN: ICD-10-CM

## 2023-12-12 PROCEDURE — 1036F TOBACCO NON-USER: CPT | Performed by: INTERNAL MEDICINE

## 2023-12-12 PROCEDURE — 99214 OFFICE O/P EST MOD 30 MIN: CPT | Performed by: INTERNAL MEDICINE

## 2023-12-12 PROCEDURE — 3074F SYST BP LT 130 MM HG: CPT | Performed by: INTERNAL MEDICINE

## 2023-12-12 PROCEDURE — 3078F DIAST BP <80 MM HG: CPT | Performed by: INTERNAL MEDICINE

## 2023-12-12 PROCEDURE — 93000 ELECTROCARDIOGRAM COMPLETE: CPT | Performed by: INTERNAL MEDICINE

## 2023-12-12 ASSESSMENT — ENCOUNTER SYMPTOMS
LOSS OF SENSATION IN FEET: 0
OCCASIONAL FEELINGS OF UNSTEADINESS: 0

## 2023-12-12 ASSESSMENT — PAIN SCALES - GENERAL: PAINLEVEL: 0-NO PAIN

## 2023-12-12 NOTE — PROGRESS NOTES
"Chief Complaint:   No chief complaint on file.     History Of Present Illness:    Dc Elizondo \"Cierra" is a 63 y.o. male with a history of hypertension, dyslipidemia, and CAD ( PCI to the LAD and LCx 5/1/2020 -RCA with ) being evaluated for routine follow-up.     Had an episode of weakness that led him to the hospital at the end of September 2023.  He has not had another episode of the profound weakness however has had 2 episodes of feeling cloudy.  One happened at the end of October and the second occurred in early November.  It lasted 1 hour and 3 hours respectively.  They did not seem to be affected by laying down.  There was no associated palpitations or chest pain during that time.    He has been off of isosorbide mononitrate and has had no recurrence of chest pain.    He is trying to look ahead to correction over the next 2 to 3 years.  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    Lexiscan nuclear stress test 10/16/2023: No evidence of ischemia or scar.  EF 71%.    Echocardiogram 9/30/2023: EF 55 to 60%.     PCI of the LAD and circumflex 5/1/20. LAD stented with Resolute Jonny 3.0 x 22 mm HAVEN and 3.0 x 8 mm HAVEN. PCI of OM1 with Resolute [place] 2.5 x 22 mm HAVEN. RCA with . Fills via left to right collaterals.     Echocardiogram 8/6/20: Normal LV size and function, EF 60-65%.      Past Medical History:  He has a past medical history of Other chest pain (10/30/2019).    Past Surgical History:  He has a past surgical history that includes Other surgical history (05/15/2020).      Social History:  He reports that he quit smoking about 47 years ago. His smoking use included cigarettes. He has never used smokeless tobacco. He reports current alcohol use of about 5.0 standard drinks of alcohol per week. He reports that he does not currently use drugs.    Family History:  No family history on file.   " "  Allergies:  Iodinated contrast media    Outpatient Medications:  Current Outpatient Medications   Medication Instructions    albuterol 90 mcg/actuation inhaler 2 puffs, inhalation, Every 6 hours PRN    amLODIPine (Norvasc) 5 mg tablet Take 1 tablet (5 mg) by mouth once daily.    ashwagandha root extract 500 mg capsule oral    aspirin 81 mg EC tablet 1 tablet, oral, Daily    atenolol (TENORMIN) 25 mg, oral, Daily    atorvastatin (Lipitor) 80 mg tablet 1 tablet, oral, Nightly    chloroquine (Aralen) 500 mg tablet oral    coQ10 (ubiquinol) 200 mg, oral, Daily    fluticasone (Flonase) 50 mcg/actuation nasal spray 1 spray, Each Nostril, Every other day, Shake gently. Before first use, prime pump. After use, clean tip and replace cap.    fluticasone propion-salmeteroL (Advair Diskus) 250-50 mcg/dose diskus inhaler 1 puff, inhalation, Daily PRN    felix, Zingiber officinalis, 500 mg capsule oral    guaiFENesin (HUMIBID 3) 400 mg, oral, Every morning    latanoprost (Xalatan) 0.005 % ophthalmic solution 1 drop, Both Eyes, Nightly    loperamide (Imodium) 2 mg capsule oral    ludmila, bulk, powder Use as directed.    magnesium 200 mg tablet 1 tablet, oral, Daily    multivitamin tablet oral    nitroglycerin (Nitrostat) 0.4 mg SL tablet sublingual    nortriptyline (PAMELOR) 30 mg, oral, Nightly    ranolazine (RANEXA) 1,000 mg, oral, 2 times daily    rivaroxaban (XARELTO) 2.5 mg, oral, 2 times daily    sertraline (ZOLOFT) 25 mg, oral, Daily    triamcinolone (Nasacort) 55 mcg nasal inhaler 1 spray, Each Nostril, Every other day    TURMERIC ORAL 1 capsule, oral, Daily    valsartan (DIOVAN) 160 mg, oral, 2 times daily       Last Recorded Vitals:  Visit Vitals  /76 (BP Location: Left arm, Patient Position: Sitting)   Pulse 65   Ht 1.803 m (5' 11\")   Wt 73 kg (161 lb)   SpO2 97%   BMI 22.45 kg/m²   Smoking Status Former   BSA 1.91 m²      LASTWT(3):   Wt Readings from Last 3 Encounters:   12/12/23 73 kg (161 lb)   12/06/23 74.4 " kg (164 lb)   09/29/23 70 kg (154 lb 5.2 oz)       Physical Exam:  In general: alert and in no acute distress.   HEENT: Carotid upstrokes normal with no bruits. JVP is normal.   Pulmonary: Clear to auscultation bilaterally.  Cardiovascular: S1,S2, regular. No appreciable murmurs, rubs or gallops.   Lower extremities: Warm. 2+ distal pulses. No edema.     Last Labs:  CBC -  Recent Labs     10/03/23  0354 09/30/23  1244 09/29/23  1635   WBC CANCELED 4.1* 5.2   HGB CANCELED 13.5 13.6   HCT CANCELED 42.5 42.7   PLT CANCELED 247 235   MCV CANCELED 88 88       CMP -  Recent Labs     10/04/23  0358 09/30/23  0747 09/29/23  1635   NA CANCELED 140 140   K CANCELED 4.5 4.6   CL CANCELED 107 107   CO2 CANCELED 28 27   ANIONGAP CANCELED 10 11   BUN CANCELED 12 17   CREATININE CANCELED 0.88 0.86   EGFR  --  >90  --    MG CANCELED 2.09 2.11     Recent Labs     09/29/23  1305 07/20/23  0738 07/01/22  0729   ALBUMIN 4.4 4.3 4.2   ALKPHOS 56 56 52   ALT 22 22 14   AST 19 19 17   BILITOT 0.9 1.0 1.1       LIPID PANEL -   Recent Labs     07/20/23  0738 07/01/22  0729 05/05/21  0736   CHOL 116 92 110   LDLF 57 41 54   HDL 39.9* 33.0* 36.5*   TRIG 94 91 97       Recent Labs     05/01/20  0536 04/30/20  1339 11/21/19  0753   BNP  --  69  --    HGBA1C 5.2  --  5.3           Assessment/Plan   1) CAD: PCI to the LAD and circumflex in 2020.  Known occluded RCA but nuclear stress testing 2023 demonstrated no evidence of ischemia.  Has been chest pain-free on ranolazine 1000 mg twice daily.    For now would recommend continuing current medications.     2) dyslipidemia: Continue atorvastatin 80 mg. LDL at goal     3) hypertension: Very well controlled. No changes needed.     4) cloudiness: Of uncertain etiology.  Does not sound cardiovascular as it did not seem to change with laying down.  Has not recurred in the last 30 days.  Would continue to observe.  If this recurs we could consider neurologic workup for an atypical migraine type  syndrome.    5) follow-up: 6 months or sooner if needed.       Benjamín Rodriguez MD

## 2024-01-03 DIAGNOSIS — E78.5 DYSLIPIDEMIA: ICD-10-CM

## 2024-01-03 DIAGNOSIS — I25.10 ATHSCL HEART DISEASE OF NATIVE CORONARY ARTERY W/O ANG PCTRS: Primary | ICD-10-CM

## 2024-01-03 DIAGNOSIS — R07.9 CHEST PAIN DUE TO PSYCHOLOGICAL STRESS: ICD-10-CM

## 2024-01-03 DIAGNOSIS — F41.9 ANXIETY: ICD-10-CM

## 2024-01-03 DIAGNOSIS — F43.9 CHEST PAIN DUE TO PSYCHOLOGICAL STRESS: ICD-10-CM

## 2024-01-03 DIAGNOSIS — I10 ESSENTIAL HYPERTENSION: ICD-10-CM

## 2024-01-03 RX ORDER — SERTRALINE HYDROCHLORIDE 25 MG/1
25 TABLET, FILM COATED ORAL DAILY
Qty: 90 TABLET | Refills: 3 | Status: SHIPPED | OUTPATIENT
Start: 2024-01-03 | End: 2024-01-03 | Stop reason: SDUPTHER

## 2024-01-03 RX ORDER — VALSARTAN 80 MG/1
160 TABLET ORAL 2 TIMES DAILY
Qty: 360 TABLET | Refills: 3 | Status: SHIPPED | OUTPATIENT
Start: 2024-01-03 | End: 2025-01-02

## 2024-01-03 RX ORDER — AMLODIPINE BESYLATE 5 MG/1
5 TABLET ORAL DAILY
Qty: 90 TABLET | Refills: 3 | Status: SHIPPED | OUTPATIENT
Start: 2024-01-03 | End: 2025-01-02

## 2024-01-03 RX ORDER — ATENOLOL 25 MG/1
25 TABLET ORAL DAILY
Qty: 90 TABLET | Refills: 3 | Status: SHIPPED | OUTPATIENT
Start: 2024-01-03 | End: 2025-01-02

## 2024-01-03 RX ORDER — RANOLAZINE 1000 MG/1
1000 TABLET, EXTENDED RELEASE ORAL 2 TIMES DAILY
Qty: 180 TABLET | Refills: 3 | Status: SHIPPED | OUTPATIENT
Start: 2024-01-03 | End: 2025-01-02

## 2024-01-03 RX ORDER — NORTRIPTYLINE HYDROCHLORIDE 10 MG/1
30 CAPSULE ORAL NIGHTLY
Qty: 270 CAPSULE | Refills: 3 | Status: SHIPPED | OUTPATIENT
Start: 2024-01-03 | End: 2025-01-02

## 2024-01-03 RX ORDER — SERTRALINE HYDROCHLORIDE 25 MG/1
25 TABLET, FILM COATED ORAL DAILY
Qty: 90 TABLET | Refills: 3 | Status: SHIPPED
Start: 2024-01-03 | End: 2024-03-06 | Stop reason: SDUPTHER

## 2024-01-03 RX ORDER — NORTRIPTYLINE HYDROCHLORIDE 10 MG/1
30 CAPSULE ORAL NIGHTLY
Qty: 270 CAPSULE | Refills: 3 | Status: SHIPPED | OUTPATIENT
Start: 2024-01-03 | End: 2024-01-03 | Stop reason: SDUPTHER

## 2024-01-03 RX ORDER — ATORVASTATIN CALCIUM 80 MG/1
80 TABLET, FILM COATED ORAL NIGHTLY
Qty: 90 TABLET | Refills: 3 | Status: SHIPPED | OUTPATIENT
Start: 2024-01-03 | End: 2025-01-02

## 2024-01-03 NOTE — TELEPHONE ENCOUNTER
"----- Message from Carol Torres CMA sent at 1/3/2024  7:40 AM EST -----  Regarding: FW: Change of insurance/pharmacy provider  Contact: [phone]    ----- Message -----  From: Dc Elizondo \"Lokesh\"  Sent: 1/2/2024   7:39 PM EST  To: [facility] [id number] [id number] Clinical Support Staff  Subject: Change of insurance/pharmacy provider            My insurance changed to [employer] so I will need to have the following prescriptions sent to [facility] in [place].  Please do 90 day supplies.  Thanks    Nortriptyline 10 mg  Three Capsules at Bedtime  Sertroline HCL 25 mg  One tab daily    Thanks,  Lokesh Elizondo    "

## 2024-01-26 ENCOUNTER — OFFICE VISIT (OUTPATIENT)
Dept: OPHTHALMOLOGY | Facility: CLINIC | Age: 64
End: 2024-01-26
Payer: COMMERCIAL

## 2024-01-26 DIAGNOSIS — H40.003 GLAUCOMA SUSPECT OF BOTH EYES: Primary | ICD-10-CM

## 2024-01-26 DIAGNOSIS — H25.13 AGE-RELATED NUCLEAR CATARACT OF BOTH EYES: ICD-10-CM

## 2024-01-26 PROCEDURE — 99214 OFFICE O/P EST MOD 30 MIN: CPT | Performed by: OPHTHALMOLOGY

## 2024-01-26 PROCEDURE — 92133 CPTRZD OPH DX IMG PST SGM ON: CPT | Performed by: OPHTHALMOLOGY

## 2024-01-26 PROCEDURE — 92083 EXTENDED VISUAL FIELD XM: CPT | Performed by: OPHTHALMOLOGY

## 2024-01-26 RX ORDER — LATANOPROST 50 UG/ML
1 SOLUTION/ DROPS OPHTHALMIC NIGHTLY
Qty: 7.5 ML | Refills: 3 | Status: SHIPPED | OUTPATIENT
Start: 2024-01-26 | End: 2025-01-25

## 2024-01-26 ASSESSMENT — SLIT LAMP EXAM - LIDS
COMMENTS: GOOD POSITION
COMMENTS: GOOD POSITION

## 2024-01-26 ASSESSMENT — CUP TO DISC RATIO
OS_RATIO: 0.7
OD_RATIO: 0.70

## 2024-01-26 ASSESSMENT — REFRACTION_WEARINGRX
OS_CYLINDER: -0.50
OD_CYLINDER: -0.25
OD_AXIS: 180
OD_SPHERE: -0.25
OS_SPHERE: PLANO
OD_ADD: +2.50
OS_AXIS: 120
OS_ADD: +2.50

## 2024-01-26 ASSESSMENT — TONOMETRY
OD_IOP_MMHG: 13
IOP_METHOD: GOLDMANN APPLANATION
OS_IOP_MMHG: 14

## 2024-01-26 ASSESSMENT — EXTERNAL EXAM - RIGHT EYE: OD_EXAM: NORMAL

## 2024-01-26 ASSESSMENT — EXTERNAL EXAM - LEFT EYE: OS_EXAM: NORMAL

## 2024-01-26 ASSESSMENT — VISUAL ACUITY
OS_CC: 20/20
METHOD: SNELLEN - LINEAR
OD_CC: 20/20
CORRECTION_TYPE: GLASSES

## 2024-01-26 ASSESSMENT — CONF VISUAL FIELD: COMMENTS: SEE HVF

## 2024-01-26 ASSESSMENT — PACHYMETRY
OD_CT(UM): 566
OS_CT(UM): 570

## 2024-01-26 NOTE — PROGRESS NOTES
glaucoma suspect, both eyes   +family hx   IOP normal   normal pachs   gonio open   large nerves tilted  OCT, Optic Nerve - OU - Both Eyes          Right Eye  Images reviewed and comparison made to baseline.     Left Eye  Images reviewed and comparison made to baseline.     Notes  Slight thinning from 2019, stable from 2021         Tolbert Visual Field - OU - Both Eyes          Right Eye  Threshold was 24-2. Findings include normal observations.     Left Eye  Threshold was 24-2. Findings include normal observations.            IOP stable   continue [facility] 6 months for IOP check      1.39

## 2024-03-06 ENCOUNTER — OFFICE VISIT (OUTPATIENT)
Dept: PRIMARY CARE | Facility: CLINIC | Age: 64
End: 2024-03-06
Payer: COMMERCIAL

## 2024-03-06 VITALS
SYSTOLIC BLOOD PRESSURE: 120 MMHG | HEIGHT: 71 IN | DIASTOLIC BLOOD PRESSURE: 60 MMHG | BODY MASS INDEX: 22.4 KG/M2 | RESPIRATION RATE: 14 BRPM | WEIGHT: 160 LBS | HEART RATE: 61 BPM | OXYGEN SATURATION: 95 % | TEMPERATURE: 97.4 F

## 2024-03-06 DIAGNOSIS — I10 HTN (HYPERTENSION), BENIGN: Primary | ICD-10-CM

## 2024-03-06 DIAGNOSIS — F41.9 ANXIETY: ICD-10-CM

## 2024-03-06 DIAGNOSIS — Z12.5 SCREENING FOR PROSTATE CANCER: ICD-10-CM

## 2024-03-06 DIAGNOSIS — J45.20 MILD INTERMITTENT ASTHMA, UNSPECIFIED WHETHER COMPLICATED (HHS-HCC): ICD-10-CM

## 2024-03-06 DIAGNOSIS — Z00.00 PERIODIC HEALTH ASSESSMENT, GENERAL SCREENING, ADULT: ICD-10-CM

## 2024-03-06 DIAGNOSIS — F51.01 PRIMARY INSOMNIA: ICD-10-CM

## 2024-03-06 PROCEDURE — 3078F DIAST BP <80 MM HG: CPT | Performed by: INTERNAL MEDICINE

## 2024-03-06 PROCEDURE — 1036F TOBACCO NON-USER: CPT | Performed by: INTERNAL MEDICINE

## 2024-03-06 PROCEDURE — 3074F SYST BP LT 130 MM HG: CPT | Performed by: INTERNAL MEDICINE

## 2024-03-06 PROCEDURE — 99214 OFFICE O/P EST MOD 30 MIN: CPT | Performed by: INTERNAL MEDICINE

## 2024-03-06 RX ORDER — FLUTICASONE PROPIONATE AND SALMETEROL 250; 50 UG/1; UG/1
1 POWDER RESPIRATORY (INHALATION) DAILY PRN
Qty: 60 EACH | Refills: 11 | Status: SHIPPED | OUTPATIENT
Start: 2024-03-06 | End: 2026-02-24

## 2024-03-06 RX ORDER — SERTRALINE HYDROCHLORIDE 50 MG/1
50 TABLET, FILM COATED ORAL DAILY
Qty: 90 TABLET | Refills: 3 | Status: SHIPPED | OUTPATIENT
Start: 2024-03-06 | End: 2025-03-06

## 2024-03-06 ASSESSMENT — ENCOUNTER SYMPTOMS
SHORTNESS OF BREATH: 0
WHEEZING: 0
CONSTIPATION: 0
DIARRHEA: 0
COUGH: 0
NAUSEA: 0
PALPITATIONS: 0

## 2024-03-06 NOTE — PROGRESS NOTES
"Subjective   Patient ID: Lokesh Elizondo is a 63 y.o. male who presents for Hypertension.    Feeling well.  However, he has been using his rescue inhaler a few times/week.  No F/C/S.  Inhaler does improve symptoms.    Anxiety and stress has been a little worse due to work demands.     Review of Systems   Respiratory:  Negative for cough, shortness of breath and wheezing.    Cardiovascular:  Negative for chest pain and palpitations.   Gastrointestinal:  Negative for constipation, diarrhea and nausea.       Objective   /60 (BP Location: Right arm, Patient Position: Sitting, BP Cuff Size: Adult)   Pulse 61   Temp 36.3 °C (97.4 °F) (Tympanic)   Resp 14   Ht 1.803 m (5' 11\")   Wt 72.6 kg (160 lb)   SpO2 95%   BMI 22.32 kg/m²     Physical Exam  Vitals reviewed.   Constitutional:       Appearance: Normal appearance.   HENT:      Head: Normocephalic.   Cardiovascular:      Rate and Rhythm: Normal rate and regular rhythm.   Pulmonary:      Effort: Pulmonary effort is normal.      Breath sounds: Normal breath sounds.   Musculoskeletal:         General: Normal range of motion.   Neurological:      General: No focal deficit present.      Mental Status: He is alert.   Psychiatric:         Mood and Affect: Mood normal.         Assessment/Plan   Problem List Items Addressed This Visit             ICD-10-CM    Anxiety F41.9    Relevant Medications    sertraline (Zoloft) 50 mg tablet    Asthma J45.909    Relevant Medications    fluticasone propion-salmeteroL (Advair Diskus) 250-50 mcg/dose diskus inhaler    HTN (hypertension), benign - Primary I10    Insomnia G47.00     Other Visit Diagnoses         Codes    Screening for prostate cancer     Z12.5    Relevant Orders    Prostate Specific Antigen    Periodic health assessment, general screening, adult     Z00.00    Relevant Orders    CBC    Comprehensive Metabolic Panel    Lipid Panel    Thyroid Stimulating Hormone        HTN is controlled.    Anxiety is a little " worse.  We will increase his zoloft and continue with healthy diet, exercise and sleep hygiene.    His ashtma is acting up  we discussed using advair twice daily and albuterol prn.  We also discussed taking claritin and flonse for allergies.    Follow up in a few months - sooner if any issues.

## 2024-03-20 DIAGNOSIS — I25.10 ATHSCL HEART DISEASE OF NATIVE CORONARY ARTERY W/O ANG PCTRS: ICD-10-CM

## 2024-06-12 ENCOUNTER — LAB (OUTPATIENT)
Dept: LAB | Facility: LAB | Age: 64
End: 2024-06-12
Payer: COMMERCIAL

## 2024-06-12 ENCOUNTER — APPOINTMENT (OUTPATIENT)
Dept: PRIMARY CARE | Facility: CLINIC | Age: 64
End: 2024-06-12
Payer: COMMERCIAL

## 2024-06-12 ENCOUNTER — APPOINTMENT (OUTPATIENT)
Dept: CARDIOLOGY | Facility: CLINIC | Age: 64
End: 2024-06-12
Payer: COMMERCIAL

## 2024-06-12 VITALS
HEIGHT: 71 IN | OXYGEN SATURATION: 99 % | TEMPERATURE: 97.2 F | HEART RATE: 64 BPM | SYSTOLIC BLOOD PRESSURE: 110 MMHG | DIASTOLIC BLOOD PRESSURE: 60 MMHG | BODY MASS INDEX: 22.88 KG/M2 | RESPIRATION RATE: 14 BRPM | WEIGHT: 163.4 LBS

## 2024-06-12 DIAGNOSIS — Z12.11 SCREENING FOR COLORECTAL CANCER: ICD-10-CM

## 2024-06-12 DIAGNOSIS — F41.9 ANXIETY: ICD-10-CM

## 2024-06-12 DIAGNOSIS — Z12.12 SCREENING FOR COLORECTAL CANCER: ICD-10-CM

## 2024-06-12 DIAGNOSIS — I25.10 ATHSCL HEART DISEASE OF NATIVE CORONARY ARTERY W/O ANG PCTRS: ICD-10-CM

## 2024-06-12 DIAGNOSIS — R53.83 OTHER FATIGUE: ICD-10-CM

## 2024-06-12 DIAGNOSIS — I10 HTN (HYPERTENSION), BENIGN: Primary | ICD-10-CM

## 2024-06-12 DIAGNOSIS — I95.1 ORTHOSTATIC HYPOTENSION: ICD-10-CM

## 2024-06-12 DIAGNOSIS — Z12.5 SCREENING FOR PROSTATE CANCER: ICD-10-CM

## 2024-06-12 DIAGNOSIS — D64.9 ANEMIA, UNSPECIFIED TYPE: Primary | ICD-10-CM

## 2024-06-12 DIAGNOSIS — E78.5 DYSLIPIDEMIA: ICD-10-CM

## 2024-06-12 DIAGNOSIS — Z00.00 PERIODIC HEALTH ASSESSMENT, GENERAL SCREENING, ADULT: ICD-10-CM

## 2024-06-12 LAB
ALBUMIN SERPL BCP-MCNC: 4.4 G/DL (ref 3.4–5)
ALP SERPL-CCNC: 60 U/L (ref 33–136)
ALT SERPL W P-5'-P-CCNC: 19 U/L (ref 10–52)
ANION GAP SERPL CALC-SCNC: 12 MMOL/L (ref 10–20)
AST SERPL W P-5'-P-CCNC: 18 U/L (ref 9–39)
BILIRUB SERPL-MCNC: 0.7 MG/DL (ref 0–1.2)
BUN SERPL-MCNC: 17 MG/DL (ref 6–23)
CALCIUM SERPL-MCNC: 9.3 MG/DL (ref 8.6–10.3)
CHLORIDE SERPL-SCNC: 106 MMOL/L (ref 98–107)
CHOLEST SERPL-MCNC: 111 MG/DL (ref 0–199)
CHOLESTEROL/HDL RATIO: 2.7
CO2 SERPL-SCNC: 28 MMOL/L (ref 21–32)
CREAT SERPL-MCNC: 0.87 MG/DL (ref 0.5–1.3)
EGFRCR SERPLBLD CKD-EPI 2021: >90 ML/MIN/1.73M*2
ERYTHROCYTE [DISTWIDTH] IN BLOOD BY AUTOMATED COUNT: 13.2 % (ref 11.5–14.5)
GLUCOSE SERPL-MCNC: 107 MG/DL (ref 74–99)
HCT VFR BLD AUTO: 40.2 % (ref 41–52)
HDLC SERPL-MCNC: 40.5 MG/DL
HGB BLD-MCNC: 12.9 G/DL (ref 13.5–17.5)
LDLC SERPL CALC-MCNC: 49 MG/DL
MCH RBC QN AUTO: 28.3 PG (ref 26–34)
MCHC RBC AUTO-ENTMCNC: 32.1 G/DL (ref 32–36)
MCV RBC AUTO: 88 FL (ref 80–100)
NON HDL CHOLESTEROL: 71 MG/DL (ref 0–149)
NRBC BLD-RTO: 0 /100 WBCS (ref 0–0)
PLATELET # BLD AUTO: 265 X10*3/UL (ref 150–450)
POTASSIUM SERPL-SCNC: 4.5 MMOL/L (ref 3.5–5.3)
PROT SERPL-MCNC: 7 G/DL (ref 6.4–8.2)
PSA SERPL-MCNC: 3.74 NG/ML
RBC # BLD AUTO: 4.56 X10*6/UL (ref 4.5–5.9)
SODIUM SERPL-SCNC: 141 MMOL/L (ref 136–145)
TRIGL SERPL-MCNC: 106 MG/DL (ref 0–149)
TSH SERPL-ACNC: 2.37 MIU/L (ref 0.44–3.98)
VLDL: 21 MG/DL (ref 0–40)
WBC # BLD AUTO: 4.8 X10*3/UL (ref 4.4–11.3)

## 2024-06-12 PROCEDURE — 84443 ASSAY THYROID STIM HORMONE: CPT

## 2024-06-12 PROCEDURE — 3074F SYST BP LT 130 MM HG: CPT | Performed by: INTERNAL MEDICINE

## 2024-06-12 PROCEDURE — 3078F DIAST BP <80 MM HG: CPT | Performed by: INTERNAL MEDICINE

## 2024-06-12 PROCEDURE — 84153 ASSAY OF PSA TOTAL: CPT

## 2024-06-12 PROCEDURE — 1036F TOBACCO NON-USER: CPT | Performed by: INTERNAL MEDICINE

## 2024-06-12 PROCEDURE — 80061 LIPID PANEL: CPT

## 2024-06-12 PROCEDURE — 80053 COMPREHEN METABOLIC PANEL: CPT

## 2024-06-12 PROCEDURE — 36415 COLL VENOUS BLD VENIPUNCTURE: CPT

## 2024-06-12 PROCEDURE — 85027 COMPLETE CBC AUTOMATED: CPT

## 2024-06-12 PROCEDURE — 99214 OFFICE O/P EST MOD 30 MIN: CPT | Performed by: INTERNAL MEDICINE

## 2024-06-12 RX ORDER — AMITRIPTYLINE HYDROCHLORIDE 10 MG/1
30 TABLET, FILM COATED ORAL NIGHTLY
Qty: 90 TABLET | Refills: 11 | Status: SHIPPED | OUTPATIENT
Start: 2024-06-12 | End: 2025-06-12

## 2024-06-12 RX ORDER — LORATADINE 10 MG
10 TABLET,DISINTEGRATING ORAL DAILY
COMMUNITY

## 2024-06-12 ASSESSMENT — ENCOUNTER SYMPTOMS
CONSTIPATION: 0
WHEEZING: 0
NAUSEA: 0
SHORTNESS OF BREATH: 0
PALPITATIONS: 0
DIARRHEA: 0
ABDOMINAL PAIN: 0
COUGH: 0

## 2024-06-12 NOTE — PROGRESS NOTES
"Subjective   Patient ID: Lokesh Elizondo is a 63 y.o. male who presents for Hypertension.    Feeling well.  Under quite a bit of stress with his partners father passing away.  He feels he is handling things well; however, still having trouble with sleep.    He is active form time to time.  No exertional CP, GUTIERREZ or dizzy spells.    We reviewed current medications as well as recent blood tests - shich he is due for recheck.      Review of Systems   Respiratory:  Negative for cough, shortness of breath and wheezing.    Cardiovascular:  Negative for chest pain and palpitations.   Gastrointestinal:  Negative for abdominal pain, constipation, diarrhea and nausea.     Objective   /60 (BP Location: Left arm, Patient Position: Sitting, BP Cuff Size: Adult)   Pulse 64   Temp 36.2 °C (97.2 °F) (Tympanic)   Resp 14   Ht 1.803 m (5' 11\")   Wt 74.1 kg (163 lb 6.4 oz)   SpO2 99%   BMI 22.79 kg/m²     Physical Exam  Vitals reviewed.   Constitutional:       Appearance: Normal appearance.   HENT:      Head: Normocephalic.   Cardiovascular:      Rate and Rhythm: Normal rate and regular rhythm.   Pulmonary:      Effort: Pulmonary effort is normal.      Breath sounds: Normal breath sounds.   Musculoskeletal:         General: Normal range of motion.   Neurological:      General: No focal deficit present.      Mental Status: He is alert.   Psychiatric:         Mood and Affect: Mood normal.         Assessment/Plan   Problem List Items Addressed This Visit             ICD-10-CM    Anxiety F41.9    Relevant Medications    amitriptyline (Elavil) 10 mg tablet    Athscl heart disease of native coronary artery w/o ang pctrs I25.10    Dyslipidemia E78.5    HTN (hypertension), benign - Primary I10    Orthostatic hypotension I95.1     Other Visit Diagnoses         Codes    Other fatigue     R53.83    Screening for colorectal cancer     Z12.11, Z12.12        We will stop nortriptyline and start amitriptyline.  We reviewed sleep " hygiene again as well.    HTN is controlled.    Cardiac symptoms are controlled; however, he is due for recheck of lipids etc.    Mild and infrequent orthostatic symptoms.  No near syncope or syncope.  We discussed increasing fluids.  If symptoms persist he should notify office for possible medication changes.    We will reassess overall risk.  Continue to work on lifestyle modifications.  Follow up in 6 months - sooner if any issues.

## 2024-06-20 ENCOUNTER — APPOINTMENT (OUTPATIENT)
Dept: CARDIOLOGY | Facility: CLINIC | Age: 64
End: 2024-06-20
Payer: COMMERCIAL

## 2024-06-26 ENCOUNTER — APPOINTMENT (OUTPATIENT)
Dept: CARDIOLOGY | Facility: CLINIC | Age: 64
End: 2024-06-26
Payer: COMMERCIAL

## 2024-06-26 LAB — NONINV COLON CA DNA+OCC BLD SCRN STL QL: NEGATIVE

## 2024-07-03 ENCOUNTER — LAB (OUTPATIENT)
Dept: LAB | Facility: LAB | Age: 64
End: 2024-07-03
Payer: COMMERCIAL

## 2024-07-03 DIAGNOSIS — D64.9 ANEMIA, UNSPECIFIED TYPE: ICD-10-CM

## 2024-07-03 LAB
BASOPHILS # BLD AUTO: 0.03 X10*3/UL (ref 0–0.1)
BASOPHILS NFR BLD AUTO: 0.7 %
EOSINOPHIL # BLD AUTO: 0.27 X10*3/UL (ref 0–0.7)
EOSINOPHIL NFR BLD AUTO: 6.2 %
ERYTHROCYTE [DISTWIDTH] IN BLOOD BY AUTOMATED COUNT: 14 % (ref 11.5–14.5)
FOLATE SERPL-MCNC: >24 NG/ML
HCT VFR BLD AUTO: 40.6 % (ref 41–52)
HGB BLD-MCNC: 13.1 G/DL (ref 13.5–17.5)
HGB RETIC QN: 32 PG (ref 28–38)
IMM GRANULOCYTES # BLD AUTO: 0.01 X10*3/UL (ref 0–0.7)
IMM GRANULOCYTES NFR BLD AUTO: 0.2 % (ref 0–0.9)
IMMATURE RETIC FRACTION: 11.5 %
IRON SATN MFR SERPL: 29 % (ref 25–45)
IRON SERPL-MCNC: 109 UG/DL (ref 35–150)
LYMPHOCYTES # BLD AUTO: 1.46 X10*3/UL (ref 1.2–4.8)
LYMPHOCYTES NFR BLD AUTO: 33.5 %
MCH RBC QN AUTO: 27.9 PG (ref 26–34)
MCHC RBC AUTO-ENTMCNC: 32.3 G/DL (ref 32–36)
MCV RBC AUTO: 87 FL (ref 80–100)
MONOCYTES # BLD AUTO: 0.5 X10*3/UL (ref 0.1–1)
MONOCYTES NFR BLD AUTO: 11.5 %
NEUTROPHILS # BLD AUTO: 2.09 X10*3/UL (ref 1.2–7.7)
NEUTROPHILS NFR BLD AUTO: 47.9 %
NRBC BLD-RTO: 0 /100 WBCS (ref 0–0)
PLATELET # BLD AUTO: 245 X10*3/UL (ref 150–450)
RBC # BLD AUTO: 4.69 X10*6/UL (ref 4.5–5.9)
RETICS #: 0.08 X10*6/UL (ref 0.02–0.12)
RETICS/RBC NFR AUTO: 1.8 % (ref 0.5–2)
TIBC SERPL-MCNC: 378 UG/DL (ref 240–445)
UIBC SERPL-MCNC: 269 UG/DL (ref 110–370)
VIT B12 SERPL-MCNC: 264 PG/ML (ref 211–911)
WBC # BLD AUTO: 4.4 X10*3/UL (ref 4.4–11.3)

## 2024-07-03 PROCEDURE — 82607 VITAMIN B-12: CPT

## 2024-07-03 PROCEDURE — 83540 ASSAY OF IRON: CPT

## 2024-07-03 PROCEDURE — 36415 COLL VENOUS BLD VENIPUNCTURE: CPT

## 2024-07-03 PROCEDURE — 82746 ASSAY OF FOLIC ACID SERUM: CPT

## 2024-07-03 PROCEDURE — 85045 AUTOMATED RETICULOCYTE COUNT: CPT

## 2024-07-03 PROCEDURE — 83550 IRON BINDING TEST: CPT

## 2024-07-03 PROCEDURE — 85025 COMPLETE CBC W/AUTO DIFF WBC: CPT

## 2024-07-11 ENCOUNTER — APPOINTMENT (OUTPATIENT)
Dept: CARDIOLOGY | Facility: CLINIC | Age: 64
End: 2024-07-11
Payer: COMMERCIAL

## 2024-07-26 ENCOUNTER — APPOINTMENT (OUTPATIENT)
Dept: OPHTHALMOLOGY | Facility: CLINIC | Age: 64
End: 2024-07-26
Payer: COMMERCIAL

## 2024-07-26 DIAGNOSIS — H40.003 GLAUCOMA SUSPECT OF BOTH EYES: ICD-10-CM

## 2024-07-26 DIAGNOSIS — H25.13 AGE-RELATED NUCLEAR CATARACT OF BOTH EYES: Primary | ICD-10-CM

## 2024-07-26 PROCEDURE — 99214 OFFICE O/P EST MOD 30 MIN: CPT | Performed by: OPHTHALMOLOGY

## 2024-07-26 ASSESSMENT — CONF VISUAL FIELD
OS_SUPERIOR_NASAL_RESTRICTION: 0
OS_INFERIOR_NASAL_RESTRICTION: 0
OD_SUPERIOR_NASAL_RESTRICTION: 0
OS_SUPERIOR_TEMPORAL_RESTRICTION: 0
OS_NORMAL: 1
METHOD: COUNTING FINGERS
OD_INFERIOR_TEMPORAL_RESTRICTION: 0
OD_SUPERIOR_TEMPORAL_RESTRICTION: 0
OS_INFERIOR_TEMPORAL_RESTRICTION: 0
OD_NORMAL: 1
OD_INFERIOR_NASAL_RESTRICTION: 0

## 2024-07-26 ASSESSMENT — ENCOUNTER SYMPTOMS
PSYCHIATRIC NEGATIVE: 0
MUSCULOSKELETAL NEGATIVE: 0
EYES NEGATIVE: 1
ALLERGIC/IMMUNOLOGIC NEGATIVE: 0
ENDOCRINE NEGATIVE: 0
RESPIRATORY NEGATIVE: 0
HEMATOLOGIC/LYMPHATIC NEGATIVE: 0
CONSTITUTIONAL NEGATIVE: 0
NEUROLOGICAL NEGATIVE: 0
CARDIOVASCULAR NEGATIVE: 0
GASTROINTESTINAL NEGATIVE: 0

## 2024-07-26 ASSESSMENT — VISUAL ACUITY
METHOD: SNELLEN - LINEAR
CORRECTION_TYPE: GLASSES
OD_CC: 20/20
OS_CC: 20/30

## 2024-07-26 ASSESSMENT — TONOMETRY
OS_IOP_MMHG: 12
OD_IOP_MMHG: 11
IOP_METHOD: GOLDMANN APPLANATION

## 2024-07-26 ASSESSMENT — REFRACTION_WEARINGRX
OD_SPHERE: -0.25
OS_SPHERE: PLANO
OD_ADD: +2.50
OS_CYLINDER: -0.50
OS_ADD: +2.50
OD_AXIS: 180
OS_AXIS: 120
OD_CYLINDER: -0.25

## 2024-07-26 ASSESSMENT — SLIT LAMP EXAM - LIDS
COMMENTS: GOOD POSITION
COMMENTS: GOOD POSITION

## 2024-07-26 ASSESSMENT — EXTERNAL EXAM - LEFT EYE: OS_EXAM: NORMAL

## 2024-07-26 ASSESSMENT — PACHYMETRY
OD_CT(UM): 566
OS_CT(UM): 570

## 2024-07-26 ASSESSMENT — EXTERNAL EXAM - RIGHT EYE: OD_EXAM: NORMAL

## 2024-07-26 NOTE — PROGRESS NOTES
glaucoma suspect, both eyes   +family hx   IOP normal   normal pachs   gonio open   large nerves tilted  OCT, Optic Nerve - OU - Both Eyes          Right Eye  Images reviewed and comparison made to baseline.     Left Eye  Images reviewed and comparison made to baseline.     Notes  Slight thinning from 2019, stable from 2021         Tolbert Visual Field - OU - Both Eyes          Right Eye  Threshold was 24-2. Findings include normal observations.     Left Eye  Threshold was 24-2. Findings include normal observations.            IOP stable   continue [facility]   rt 6 months for IHVF 24-2, dfe, oct rnfl

## 2024-07-30 ENCOUNTER — APPOINTMENT (OUTPATIENT)
Dept: CARDIOLOGY | Facility: CLINIC | Age: 64
End: 2024-07-30
Payer: COMMERCIAL

## 2024-07-30 VITALS
HEIGHT: 71 IN | DIASTOLIC BLOOD PRESSURE: 60 MMHG | RESPIRATION RATE: 18 BRPM | OXYGEN SATURATION: 98 % | WEIGHT: 162.9 LBS | SYSTOLIC BLOOD PRESSURE: 114 MMHG | HEART RATE: 62 BPM | BODY MASS INDEX: 22.81 KG/M2

## 2024-07-30 DIAGNOSIS — E78.5 DYSLIPIDEMIA: ICD-10-CM

## 2024-07-30 DIAGNOSIS — I10 HTN (HYPERTENSION), BENIGN: ICD-10-CM

## 2024-07-30 DIAGNOSIS — I10 ESSENTIAL HYPERTENSION: ICD-10-CM

## 2024-07-30 DIAGNOSIS — R07.89 ATYPICAL CHEST PAIN: ICD-10-CM

## 2024-07-30 DIAGNOSIS — I25.10 ATHSCL HEART DISEASE OF NATIVE CORONARY ARTERY W/O ANG PCTRS: Primary | ICD-10-CM

## 2024-07-30 PROCEDURE — 1036F TOBACCO NON-USER: CPT | Performed by: NURSE PRACTITIONER

## 2024-07-30 PROCEDURE — 3008F BODY MASS INDEX DOCD: CPT | Performed by: NURSE PRACTITIONER

## 2024-07-30 PROCEDURE — 99214 OFFICE O/P EST MOD 30 MIN: CPT | Performed by: NURSE PRACTITIONER

## 2024-07-30 PROCEDURE — 3078F DIAST BP <80 MM HG: CPT | Performed by: NURSE PRACTITIONER

## 2024-07-30 PROCEDURE — 3074F SYST BP LT 130 MM HG: CPT | Performed by: NURSE PRACTITIONER

## 2024-07-30 RX ORDER — AMLODIPINE BESYLATE 5 MG/1
5 TABLET ORAL DAILY
Qty: 90 TABLET | Refills: 3 | Status: SHIPPED | OUTPATIENT
Start: 2024-07-30 | End: 2025-07-30

## 2024-07-30 RX ORDER — ATENOLOL 25 MG/1
25 TABLET ORAL DAILY
Qty: 90 TABLET | Refills: 3 | Status: SHIPPED | OUTPATIENT
Start: 2024-07-30 | End: 2025-07-30

## 2024-07-30 RX ORDER — RANOLAZINE 1000 MG/1
1000 TABLET, EXTENDED RELEASE ORAL 2 TIMES DAILY
Qty: 180 TABLET | Refills: 3 | Status: SHIPPED | OUTPATIENT
Start: 2024-07-30 | End: 2025-07-30

## 2024-07-30 RX ORDER — NITROGLYCERIN 0.4 MG/1
0.4 TABLET SUBLINGUAL EVERY 5 MIN PRN
Qty: 90 TABLET | Refills: 0 | Status: SHIPPED | OUTPATIENT
Start: 2024-07-30 | End: 2024-10-28

## 2024-07-30 RX ORDER — VALSARTAN 80 MG/1
160 TABLET ORAL 2 TIMES DAILY
Qty: 360 TABLET | Refills: 3 | Status: SHIPPED | OUTPATIENT
Start: 2024-07-30 | End: 2025-07-30

## 2024-07-30 RX ORDER — ATORVASTATIN CALCIUM 80 MG/1
80 TABLET, FILM COATED ORAL NIGHTLY
Qty: 90 TABLET | Refills: 3 | Status: SHIPPED | OUTPATIENT
Start: 2024-07-30 | End: 2025-07-30

## 2024-07-30 RX ORDER — SERTRALINE HYDROCHLORIDE 25 MG/1
25 TABLET, FILM COATED ORAL DAILY
COMMUNITY
Start: 2024-06-10 | End: 2024-07-30 | Stop reason: SDUPTHER

## 2024-07-30 RX ORDER — ASPIRIN 81 MG/1
81 TABLET ORAL DAILY
Qty: 90 TABLET | Refills: 3 | Status: SHIPPED | OUTPATIENT
Start: 2024-07-30 | End: 2025-07-30

## 2024-07-30 NOTE — PROGRESS NOTES
"Name : Dc Elizondo   : 1960   MRN : [id number]   ENC Date : 2024    Primary Cardiologist: Dr. Rodriguez     CC: Coronary Artery Disease (6 month f/u)     HPI:    \"Lokesh\" Dc Elizondo is a 63 y.o. male with PMHx sig for hypertension, dyslipidemia, and CAD s/p PCI to the LAD and LCx 2020 -RCA with  being evaluated for routine follow-up.     Reports that since his hospitalization in 2023 he has not fully regained his energy. \"Sleep is not good\". He has a lot of stress. 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 Reports home life stress. His grandson who has a disability lives with him. He has to do PT every evening with him & drive him to appointments. Also reports grandson has a history of trauma so will have episodes of terror in the middle of the night which wakes him up. Also watches his 2 other grandkids on the weekends from 6am to 10pm.    Reports he's had 3 episodes of this left arm numbness since his hospitalization. Has used nitroglycerin & it abates.    Has started exercising again. Rides the stationary bike 2-3x a week \"6 mins\" each time but with \"more intensity\".    Looks like he was on imdur in the past. Lokesh reports that Dr. Rodriguez switched this to amlodipine.      CV Diagnostics:  Lexiscan nuclear stress test 10/16/2023: No evidence of ischemia or scar.  EF 71%.     Echocardiogram 2023: EF 55 to 60%.     PCI of the LAD and circumflex 20. LAD stented with Resolute Jonny 3.0 x 22 mm HAVEN and 3.0 x 8 mm HAVEN. PCI of OM1 with Resolute Jonny 2.5 x 22 mm HAVEN. RCA with . Fills via left to right collaterals.     Echocardiogram 20: Normal LV size and function, EF 60-65%.     ROS: unless otherwise noted in the history of present illness, all other " systems were reviewed and they are negative for complaints     Allergies:  Iodinated contrast media    Current Outpatient Medications   Medication Instructions    albuterol 90 mcg/actuation inhaler 2 puffs, inhalation, Every 6 hours PRN    amitriptyline (ELAVIL) 30 mg, oral, Nightly    amLODIPine (NORVASC) 5 mg, oral, Daily    ashwagandha root extract 500 mg capsule oral    aspirin 81 mg, oral, Daily    atenolol (TENORMIN) 25 mg, oral, Daily    atorvastatin (LIPITOR) 80 mg, oral, Nightly    chloroquine (Aralen) 500 mg tablet oral    coQ10 (ubiquinol) 200 mg, oral, Daily    fluticasone (Flonase) 50 mcg/actuation nasal spray 1 spray, Each Nostril, Every other day, Shake gently. Before first use, prime pump. After use, clean tip and replace cap.    fluticasone propion-salmeteroL (Advair Diskus) 250-50 mcg/dose diskus inhaler 1 puff, inhalation, Daily PRN    felix, Zingiber officinalis, 500 mg capsule oral    guaiFENesin (HUMIBID 3) 400 mg, oral, Every morning    latanoprost (Xalatan) 0.005 % ophthalmic solution 1 drop, Both Eyes, Nightly    loperamide (Imodium) 2 mg capsule oral    loratadine (CLARITIN REDITABS) 10 mg, oral, Daily    ludmila, bulk, powder Use as directed.    magnesium 200 mg tablet 1 tablet, oral, Daily    multivitamin tablet oral    nitroglycerin (NITROSTAT) 0.4 mg, sublingual, Every 5 min PRN    ranolazine (RANEXA) 1,000 mg, oral, 2 times daily    rivaroxaban (XARELTO) 2.5 mg, oral, 2 times daily    sertraline (ZOLOFT) 50 mg, oral, Daily    triamcinolone (Nasacort) 55 mcg nasal inhaler 1 spray, Each Nostril, Every other day    TURMERIC ORAL 1 capsule, oral, Daily    valsartan (DIOVAN) 160 mg, oral, 2 times daily        Last Labs:  CBC  Lab Results   Component Value Date    WBC 4.4 07/03/2024    HGB 13.1 (L) 07/03/2024    HCT 40.6 (L) 07/03/2024    MCV 87 07/03/2024     07/03/2024       CMP  Lab Results   Component Value Date    CALCIUM 9.3 06/12/2024    PROT 7.0 06/12/2024    ALBUMIN 4.4  "06/12/2024    AST 18 06/12/2024    ALT 19 06/12/2024    ALKPHOS 60 06/12/2024    BILITOT 0.7 06/12/2024       BMP   Lab Results   Component Value Date     06/12/2024    K 4.5 06/12/2024     06/12/2024    CO2 28 06/12/2024    GLUCOSE 107 (H) 06/12/2024    BUN 17 06/12/2024    CREATININE 0.87 06/12/2024       LIPID PANEL   Lab Results   Component Value Date    CHOL 111 06/12/2024    TRIG 106 06/12/2024    HDL 40.5 06/12/2024    CHHDL 2.7 06/12/2024    LDLF 57 07/20/2023    VLDL 21 06/12/2024    NHDL 71 06/12/2024       RENAL FUNCTION PANEL   Lab Results   Component Value Date    GLUCOSE 107 (H) 06/12/2024     06/12/2024    K 4.5 06/12/2024     06/12/2024    CO2 28 06/12/2024    ANIONGAP 12 06/12/2024    BUN 17 06/12/2024    CREATININE 0.87 06/12/2024    GFRMALE CANCELED 10/04/2023    CALCIUM 9.3 06/12/2024    ALBUMIN 4.4 06/12/2024        Lab Results   Component Value Date    BNP 69 04/30/2020    HGBA1C 5.2 05/01/2020     I have reviewed the above labs & diagnostics    Last Recorded Vitals:  Vitals:    07/30/24 0729   BP: 114/60   BP Location: Left arm   Patient Position: Sitting   Pulse: 62   Resp: 18   SpO2: 98%   Weight: 73.9 kg (162 lb 14.4 oz)   Height: 1.803 m (5' 11\")     Physical Exam:  On exam Mr. Dc Elizondo appears his stated age, is alert and oriented x3, and in no acute distress. His sclera are anicteric and his oropharynx has moist mucous membranes. His neck is supple and without thyromegaly. The JVP is ~5 cm of water above the right atrium. His cardiac exam has regular rhythm, normal S1, S2. No S3/4. There are no murmurs. His lungs are clear to auscultation bilaterally and there is no dullness to percussion. His abdomen is soft, nontender with normoactive bowel sounds. There is no HJR. The extremities are warm and without edema. The skin is dry. There is no rash present. The distal pulses are 2-3+ in all four extremities. His mood and affect are appropriate for " todays encounter.     Assessment/Plan:  1) CAD: PCI to the LAD and circumflex in 2020.  Known occluded RCA but nuclear stress testing 2023 demonstrated no evidence of ischemia.  Has had 3 episodes of arm numbness since 12/2023. Has used his nitroglycerin. Reports that he knows his work life & home life are contributing factors. He is exercising without chest pain. Discussed re-initiation of Imdur instead of amlodipine vs watchful waiting given his nuclear stress test was normal. At this time, Lokesh would like to just monitor. Continue with Ranolazine 1000 mg twice daily.     2) dyslipidemia: Continue atorvastatin 80 mg. LDL at goal     3) hypertension: Very well controlled.      4) Fatigue: most definitely multifactorial. Encouraged good sleep hygiene. He is not sure if he snores. Advised he ask his spouse as he may need evaluated for sleep apnea as well     follow-up 6 months or sooner if needed.     Tracy M Schwab, APRN-CNP

## 2024-08-30 DIAGNOSIS — R07.89 ATYPICAL CHEST PAIN: ICD-10-CM

## 2024-08-30 RX ORDER — NITROGLYCERIN 0.4 MG/1
TABLET SUBLINGUAL
Qty: 90 TABLET | Refills: 0 | Status: SHIPPED | OUTPATIENT
Start: 2024-08-30

## 2024-11-19 ENCOUNTER — APPOINTMENT (OUTPATIENT)
Dept: INFECTIOUS DISEASES | Facility: CLINIC | Age: 64
End: 2024-11-19
Payer: COMMERCIAL

## 2024-11-19 DIAGNOSIS — Z71.84 COUNSELING FOR TRAVEL: Primary | ICD-10-CM

## 2024-11-19 DIAGNOSIS — Z23 NEED FOR VACCINATION: ICD-10-CM

## 2024-11-19 PROCEDURE — 90690 TYPHOID VACCINE ORAL: CPT | Performed by: INTERNAL MEDICINE

## 2024-11-19 PROCEDURE — 99202U03 TRAVEL CONSULT (U03): Performed by: INTERNAL MEDICINE

## 2024-11-19 RX ORDER — ATOVAQUONE AND PROGUANIL HYDROCHLORIDE 250; 100 MG/1; MG/1
1 TABLET, FILM COATED ORAL DAILY
Qty: 17 TABLET | Refills: 0 | Status: SHIPPED | OUTPATIENT
Start: 2024-11-19

## 2024-11-19 RX ORDER — CIPROFLOXACIN 500 MG/1
500 TABLET ORAL 2 TIMES DAILY
Qty: 6 TABLET | Refills: 0 | Status: SHIPPED | OUTPATIENT
Start: 2024-11-19 | End: 2024-11-22

## 2024-11-19 NOTE — PROGRESS NOTES
"Pre-Travel Counseling Visit Note:    Dc Elizondo is a 64 y.o.male here for Pre-travel counseling.    They have travelled internationally in the past  [place] (13 times)  Prior Traveler’s Health Care was obtained from: [facility]    They will be visiting:   [place]  1/15/24 x 7 days  The purpose of this trip is:    Medical [place]    Accommodations include:   Staying at [facility].  Has access to purified drinking water and purified water used in cooking.  All meals prepared on site.    Planned activities include:    Medical mission work    High altitude activities are part of their itinerary: No  Potential Travel Exposures include:  Insects,   Pertinent medical History:       Past Medical History:   Diagnosis Date    Glaucoma suspect     Other chest pain 10/30/2019    Chest wall pain     They have received the following Travel vaccinations:    Hepatitis A (completed), Oral Typhoid 2019  They have received treatment for the following travel related illnesses in the past: None      ORDERS for this consultation visit:  Problem List Items Addressed This Visit      During the travel consultation, discussions included food and water/fluid precautions, insect repellants/precautions, vaccinations, prophylactic medications, as well as other health related questions.  A summary of discussion was included in the patient \"wrap-up\"/instructions sheets as well as in supplemental information from [employer] supplied at the visit.       Counseling for travel    -  Primary    Relevant Medications    atovaquone-proguaniL (Malarone) 250-100 mg tablet    ciprofloxacin (Cipro) 500 mg tablet    Other Relevant Orders    Typhoid vaccine oral    Need for vaccination                    "

## 2024-11-19 NOTE — PATIENT INSTRUCTIONS
You were seen today for your upcoming trip.    For your country specific issues, please refer back to the [employer] handouts supplied to you in the travel brochure folder that we provided to you at your visit.  These are updated daily, if necessary, by the reporting agencies, so are a valuable source of information for your trip.    This brief summary may not contain all of the items that we discussed today.  Please review the handouts given to you as well.    ** Please be sure to carry any medications with you in your carry-on luggage in the event that your luggage is delayed or lost during your travels.    ** For your trip, as we discussed, standard food and water precautions apply.     You should avoid drinking any water that is not bottled.  Alcoholic or carbonated beverages are safe to drink.  Any beverage that has been brought to a rolling boil for  3 minutes is also safe to drink (once it has cooled some).  Commercially purchased milk products that are boxed (may be on store shelves) or refrigerated, are pasteurized and therefore safe to drink as long as they are refrigerated after opening.  Juices that are prepared commercially (in boxes or individual bottles) are also safe to drink as they are pasteurized as well.  Avoid road-side juice vendors as the juice may be diluted with contaminated water or produced from unclean fruit.  Regarding food precautions, you want to make sure that meats are well cooked.   Avoid eating fresh fruits and vegetables raw with the skin intact.  Peel before eating.  Avoid salads due to possible contamination by contaminated water.  Avoid any unpasteurized cheeses (they typically are very white in appearance)    ** We recommend that you use insect repellant to help you prevent infections caused by biting insects such as mosquitoes, flies, ticks, fleas and chiggers.  This includes protection against Zika virus, Dengue virus, Chikungunya and Malaria, just to name a few.    For insect  "repellents that are applied directly to the skin, we recommend DEET containing repellants with a percentage between 20-40%.  Apply to skin exposed surfaces only, every 6-8 hours throughout the day.  I typically recommend applying before breakfast, lunch and dinner as these are natural breaks in your day.  Wash your hands after applying. Apply again in the evening.  You must reapply after bathing or swimming as it is washed away with water.  Apply after sunscreen products are applied. DEET containing repellants protect against all concerning insects with the exception of hornets, wasps or bees. Activity against ticks only lasts for 2 hours. For additional Tick precautions while hiking, tuck your pant legs into your socks as this will prevent ticks from crawling up your shoes / socks onto your legs while walking. Perform a \"tick check\" at least once daily, at the end of your day.  Check in the sporting goods areas of most stores for these products.  A recommended alternative, Picardin ,may also be used every 8 hours.  If you are unable to locate the product in stores, try on-line stores as well.      PERMETHRIN SPRAY  is an additional option and is for application to clothing and garments only.  This can be applied to hats, bandanas, socks, boots and any clothing items to prevent insect attention.  I can be applied before you leave and will remain active through many washes and for up to 6 weeks in unwashed clothing.  Read any packaging for full specifications. Apply in an open area as when wet, it has an odor. Once dry, it typically has no odor and does not stain clothing. Regular repellants should be applied to exposed skin however.  Permethrin may only be available on-line.     **  As a general safety procedure, we recommend that you scan a copy of your passport, any travel required documents (yellow fever vaccine card), and itinerary and email them to yourself.  Keep these in a special travel folder for reference " in the event that your travel documents are misplaced or stolen while abroad.  You should also leave a detailed copy of your itinerary with a friend or relative at home for reference as well.  If traveling for long periods, an international SIM card or global plan for your cellular service may be beneficial for communication. This list is not meant to be all inclusive.      **  Please review and understand any cultural aspects of the countries that you will be visiting to ensure that appropriate dress and behaviors are understood.  ENJOY YOUR TRIP (o:      **  Make sure to come back to complete any vaccine series that may have been started today.  This will ensure full vaccine efficacy and protection for future travel.

## 2024-12-18 ENCOUNTER — APPOINTMENT (OUTPATIENT)
Dept: PRIMARY CARE | Facility: CLINIC | Age: 64
End: 2024-12-18
Payer: COMMERCIAL

## 2024-12-18 VITALS
HEIGHT: 71 IN | TEMPERATURE: 97.9 F | RESPIRATION RATE: 14 BRPM | HEART RATE: 68 BPM | DIASTOLIC BLOOD PRESSURE: 80 MMHG | SYSTOLIC BLOOD PRESSURE: 130 MMHG | WEIGHT: 170 LBS | OXYGEN SATURATION: 97 % | BODY MASS INDEX: 23.8 KG/M2

## 2024-12-18 DIAGNOSIS — H61.21 IMPACTED CERUMEN OF RIGHT EAR: ICD-10-CM

## 2024-12-18 DIAGNOSIS — Z12.5 SCREENING FOR PROSTATE CANCER: ICD-10-CM

## 2024-12-18 DIAGNOSIS — I10 ESSENTIAL HYPERTENSION: Primary | ICD-10-CM

## 2024-12-18 DIAGNOSIS — F41.9 ANXIETY: ICD-10-CM

## 2024-12-18 DIAGNOSIS — R53.83 OTHER FATIGUE: ICD-10-CM

## 2024-12-18 DIAGNOSIS — Z00.00 PERIODIC HEALTH ASSESSMENT, GENERAL SCREENING, ADULT: ICD-10-CM

## 2024-12-18 PROCEDURE — 3075F SYST BP GE 130 - 139MM HG: CPT | Performed by: INTERNAL MEDICINE

## 2024-12-18 PROCEDURE — 1036F TOBACCO NON-USER: CPT | Performed by: INTERNAL MEDICINE

## 2024-12-18 PROCEDURE — 3079F DIAST BP 80-89 MM HG: CPT | Performed by: INTERNAL MEDICINE

## 2024-12-18 PROCEDURE — 3008F BODY MASS INDEX DOCD: CPT | Performed by: INTERNAL MEDICINE

## 2024-12-18 PROCEDURE — 99214 OFFICE O/P EST MOD 30 MIN: CPT | Performed by: INTERNAL MEDICINE

## 2024-12-18 ASSESSMENT — ENCOUNTER SYMPTOMS
SHORTNESS OF BREATH: 0
PALPITATIONS: 0
COUGH: 0
CONSTIPATION: 0
WHEEZING: 0
ABDOMINAL PAIN: 0
DIARRHEA: 0
NAUSEA: 0

## 2024-12-18 NOTE — PROGRESS NOTES
"Subjective   Patient ID: Lokesh Elizondo is a 64 y.o. male who presents for Hypertension.    Fatigue and muscle weakness.    Wakes up groggy with Elavil.    Not as active as he should be.  Stress and anxiety    Review of Systems   Respiratory:  Negative for cough, shortness of breath and wheezing.    Cardiovascular:  Negative for chest pain and palpitations.   Gastrointestinal:  Negative for abdominal pain, constipation, diarrhea and nausea.       Objective   /80 (BP Location: Left arm, Patient Position: Sitting, BP Cuff Size: Adult)   Pulse 68   Temp 36.6 °C (97.9 °F) (Tympanic)   Resp 14   Ht 1.803 m (5' 11\")   Wt 77.1 kg (170 lb)   SpO2 97%   BMI 23.71 kg/m²     Physical Exam    Assessment/Plan   Problem List Items Addressed This Visit             ICD-10-CM    Anxiety F41.9    Essential hypertension - Primary I10     Other Visit Diagnoses         Codes    Other fatigue     R53.83    Impacted cerumen of right ear     H61.21    Screening for prostate cancer     Z12.5    Relevant Orders    Prostate Specific Antigen    Periodic health assessment, general screening, adult     Z00.00    Relevant Orders    CBC    Comprehensive Metabolic Panel    Lipid Panel    Thyroid Stimulating Hormone        Anxiety is controlled.    HTN is controlled.  No changes needed.    Fatigue with low normal B12.  Limited red meat intake.    Impacted cerumen left ear.  He does not wish to have us irrigate it today.      We will try dissolvable 1000 mcg B12 to help with energy. If no improvement after a couple weeks he is to contact us and we will consider checking labs  Testosterone, TSH, sed rate, blood counts etc.    "

## 2025-02-06 ENCOUNTER — APPOINTMENT (OUTPATIENT)
Dept: CARDIOLOGY | Facility: CLINIC | Age: 65
End: 2025-02-06
Payer: COMMERCIAL

## 2025-02-25 ENCOUNTER — APPOINTMENT (OUTPATIENT)
Dept: OPHTHALMOLOGY | Facility: CLINIC | Age: 65
End: 2025-02-25
Payer: COMMERCIAL

## 2025-02-25 DIAGNOSIS — H25.13 AGE-RELATED NUCLEAR CATARACT OF BOTH EYES: ICD-10-CM

## 2025-02-25 DIAGNOSIS — H40.003 GLAUCOMA SUSPECT OF BOTH EYES: Primary | ICD-10-CM

## 2025-02-25 PROCEDURE — 92083 EXTENDED VISUAL FIELD XM: CPT | Performed by: OPHTHALMOLOGY

## 2025-02-25 PROCEDURE — 99214 OFFICE O/P EST MOD 30 MIN: CPT | Performed by: OPHTHALMOLOGY

## 2025-02-25 PROCEDURE — 92133 CPTRZD OPH DX IMG PST SGM ON: CPT | Performed by: OPHTHALMOLOGY

## 2025-02-25 RX ORDER — LATANOPROST 50 UG/ML
1 SOLUTION/ DROPS OPHTHALMIC NIGHTLY
COMMUNITY

## 2025-02-25 ASSESSMENT — VISUAL ACUITY
METHOD: SNELLEN - LINEAR
OS_CC: 20/20
CORRECTION_TYPE: GLASSES
OD_CC: 20/20

## 2025-02-25 ASSESSMENT — TONOMETRY
OS_IOP_MMHG: 12
OD_IOP_MMHG: 12
IOP_METHOD: GOLDMANN APPLANATION

## 2025-02-25 ASSESSMENT — REFRACTION_WEARINGRX
OD_ADD: +2.50
OD_AXIS: 180
OD_CYLINDER: -0.25
OD_SPHERE: -0.25
OS_CYLINDER: -0.50
OS_ADD: +2.50
OS_AXIS: 120
OS_SPHERE: PLANO

## 2025-02-25 ASSESSMENT — SLIT LAMP EXAM - LIDS
COMMENTS: GOOD POSITION
COMMENTS: GOOD POSITION

## 2025-02-25 ASSESSMENT — EXTERNAL EXAM - RIGHT EYE: OD_EXAM: NORMAL

## 2025-02-25 ASSESSMENT — CONF VISUAL FIELD: COMMENTS: SEE HVF

## 2025-02-25 ASSESSMENT — CUP TO DISC RATIO
OD_RATIO: 0.70
OS_RATIO: 0.7

## 2025-02-25 ASSESSMENT — EXTERNAL EXAM - LEFT EYE: OS_EXAM: NORMAL

## 2025-02-25 ASSESSMENT — PACHYMETRY
OD_CT(UM): 566
OS_CT(UM): 570

## 2025-02-25 NOTE — PROGRESS NOTES
glaucoma suspect, both eyes   +family hx   IOP normal   normal pachs   gonio open   large nerves tilted  OCT, Optic Nerve - OU - Both Eyes          Thinning OS>od, STABLE to prior         Tolbert Visual Field - OU - Both Eyes          Normal OU           IOP stable   continue latan qhs   rtc 6 months for IOP check

## 2025-02-26 NOTE — PROGRESS NOTES
"Chief Complaint:   No chief complaint on file.     History Of Present Illness:    Dc Elizondo \"Lokesh\" is a 64 y.o. male with a history of hypertension, dyslipidemia, and CAD ( PCI to the LAD and LCx 5/1/2020 -RCA with ) being evaluated for routine follow-up.    Has 3 complaints today.  The first is that he has been having episodes of chest discomfort requiring nitroglycerin.  He believes that he has had about 6 episodes in the last 3 months.  The most recent occurred on February 12.  He was at work and there was a lot of stressful issues occurring.  He felt palpitations and checked his heart rate and blood pressure with an automated wrist cuff.  Heart rate was as high as 130 bpm.  He also had some chest discomfort and took 2 nitroglycerin eventually.  His discomfort in the chest resolved but his heart rate took several hours to calm down.  Even the next day it was at 70 bpm where he usually records a reading of about 60 bpm.  This is not occurred since.    He also describes muscle weakness and fatigue.  These have been going on for several weeks.  He has not been able to exercise at home when he gets back from work because he is so exhausted.  He also finds that it is hard to even open up jars in the kitchen because of the weakness in his arms.    He has never been formally tested for sleep apnea however does not sleep well.  He has issues falling asleep and staying asleep.  He was started on Elavil and believes that he now is able to fall asleep and sleep throughout the night without awakening.     In the past he was on isosorbide mononitrate however this was stopped because he had no longer been experiencing chest discomfort.    He is trying to look ahead to [facility] over the next year or so.  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    Patient saw Tracy Schwab on 7/30/2024.  I reviewed that " note.    Lexiscan nuclear stress test 10/16/2023: No evidence of ischemia or scar.  EF 71%.    Echocardiogram 9/30/2023: EF 55 to 60%.     PCI of the LAD and circumflex 5/1/20. LAD stented with Resolute [place] 3.0 x 22 mm HAVEN and 3.0 x 8 mm HAVEN. PCI of OM1 with Resolute Jonny 2.5 x 22 mm HAVEN. RCA with . Fills via left to right collaterals.     Echocardiogram 8/6/20: Normal LV size and function, EF 60-65%.      Past Medical History:  He has a past medical history of Glaucoma suspect and Other chest pain (10/30/2019).    Past Surgical History:  He has a past surgical history that includes Other surgical history (05/15/2020).      Social History:  He reports that he quit smoking about 49 years ago. His smoking use included cigarettes. He has never used smokeless tobacco. He reports current alcohol use of about 5.0 standard drinks of alcohol per week. He reports that he does not currently use drugs.    Family History:  Family History   Problem Relation Name Age of Onset    Glaucoma Maternal Grandmother          Allergies:  Iodinated contrast media    Outpatient Medications:  Current Outpatient Medications   Medication Instructions    albuterol 90 mcg/actuation inhaler 2 puffs, Every 6 hours PRN    amitriptyline (ELAVIL) 30 mg, oral, Nightly    amLODIPine (NORVASC) 5 mg, oral, Daily    ashwagandha root extract 500 mg capsule Take by mouth.    aspirin 81 mg, oral, Daily    atenolol (TENORMIN) 25 mg, oral, Daily    atorvastatin (LIPITOR) 80 mg, oral, Nightly    atovaquone-proguaniL (Malarone) 250-100 mg tablet 1 tablet, oral, Daily, Begin taking daily, 1-2 days before entering malaria region. Take at same time daily while there and for 7 days after leaving malaria region. Do not allow more than 24 hours between doses    coQ10 (ubiquinol) 200 mg, Daily    fluticasone (Flonase) 50 mcg/actuation nasal spray 1 spray, Every other day    fluticasone propion-salmeteroL (Advair Diskus) 250-50 mcg/dose diskus inhaler 1 puff,  "inhalation, Daily PRN    felix, Zingiber officinalis, 500 mg capsule Take by mouth.    guaiFENesin (HUMIBID 3) 400 mg, Every morning    latanoprost (Xalatan) 0.005 % ophthalmic solution 1 drop, Nightly    loperamide (Imodium) 2 mg capsule Take by mouth.    loratadine (CLARITIN REDITABS) 10 mg, Daily    ludmila, bulk, powder Use as directed.    magnesium 200 mg tablet 1 tablet, Daily    multivitamin tablet Take by mouth.    nitroglycerin (Nitrostat) 0.4 mg SL tablet place 1 tablet by mouth under the tongue every 5 minutes if needed for chest pain    ranolazine (RANEXA) 1,000 mg, oral, 2 times daily    rivaroxaban (XARELTO) 2.5 mg, oral, 2 times daily    sertraline (ZOLOFT) 50 mg, oral, Daily    triamcinolone (Nasacort) 55 mcg nasal inhaler 1 spray, Every other day    TURMERIC ORAL 1 capsule, Daily    valsartan (DIOVAN) 160 mg, oral, 2 times daily       Last Recorded Vitals:  Visit Vitals  /82 (BP Location: Right arm, Patient Position: Sitting)   Pulse 71   Ht 1.803 m (5' 11\")   Wt 77.6 kg (171 lb)   SpO2 98%   BMI 23.85 kg/m²   Smoking Status Former   BSA 1.97 m²      LASTWT(3):   Wt Readings from Last 3 Encounters:   02/27/25 77.6 kg (171 lb)   12/18/24 77.1 kg (170 lb)   07/30/24 73.9 kg (162 lb 14.4 oz)       Physical Exam:  In general: alert and in no acute distress.   HEENT: Carotid upstrokes normal with no bruits. JVP is normal.   Pulmonary: Clear to auscultation bilaterally.  Cardiovascular: S1,S2, regular. No appreciable murmurs, rubs or gallops.   Lower extremities: Warm. 2+ distal pulses. No edema.     Last Labs:  CBC -  Recent Labs     07/03/24  1221 06/12/24  1021 10/03/23  0354   WBC 4.4 4.8 CANCELED   HGB 13.1* 12.9* CANCELED   HCT 40.6* 40.2* CANCELED    265 CANCELED   MCV 87 88 CANCELED       CMP -  Recent Labs     06/12/24  1021 10/04/23  0358 09/30/23  0747 09/29/23  1635    CANCELED 140 140   K 4.5 CANCELED 4.5 4.6    CANCELED 107 107   CO2 28 CANCELED 28 27   ANIONGAP 12 " CANCELED 10 11   BUN 17 CANCELED 12 17   CREATININE 0.87 CANCELED 0.88 0.86   EGFR >90  --  >90  --    MG  --  CANCELED 2.09 2.11     Recent Labs     06/12/24  1021 09/29/23  1305 07/20/23  0738   ALBUMIN 4.4 4.4 4.3   ALKPHOS 60 56 56   ALT 19 22 22   AST 18 19 19   BILITOT 0.7 0.9 1.0       LIPID PANEL -   Recent Labs     06/12/24  1021 07/20/23  0738 07/01/22  0729 05/05/21  0736   CHOL 111 116 92 110   LDLCALC 49  --   --   --    LDLF  --  57 41 54   HDL 40.5 39.9* 33.0* 36.5*   TRIG 106 94 91 97       Recent Labs     05/01/20  0536 04/30/20  1339 11/21/19  0753   BNP  --  69  --    HGBA1C 5.2  --  5.3           Assessment/Plan   1) CAD: PCI to the LAD and circumflex in 2020.  Known occluded RCA but nuclear stress testing 2023 demonstrated no evidence of ischemia.  Intermittent episodes of chest discomfort requiring nitroglycerin.  Last 1 about 2 weeks ago.  Told him that we could consider restarting the isosorbide mononitrate or continuing to observe for now.  For now we will continue to observe.  If the frequency of his chest discomfort continues then we will start the isosorbide mononitrate.    I do not believe that we need stress testing at this time.     2) dyslipidemia: Continue atorvastatin 80 mg. LDL at goal     3) hypertension: Very well controlled however he wonders of the amlodipine could be causing his fatigue or weakness.  Asked him to cut it in half for a week and give us a call with an update next week.  If there is no difference in the fatigue then I would ask him to go back to his normal dose.     4) weakness and fatigue: I believe that this very well could be from disturbed sleep which may be masked now with the use of the Elavil.  I would like to have him evaluated by the adult sleep medicine team for possible sleep apnea.    5) follow-up: 3 months or sooner if needed.       Benjamín Rodriguez MD

## 2025-02-27 ENCOUNTER — APPOINTMENT (OUTPATIENT)
Dept: CARDIOLOGY | Facility: CLINIC | Age: 65
End: 2025-02-27
Payer: COMMERCIAL

## 2025-02-27 VITALS
WEIGHT: 171 LBS | DIASTOLIC BLOOD PRESSURE: 82 MMHG | HEART RATE: 71 BPM | BODY MASS INDEX: 23.94 KG/M2 | HEIGHT: 71 IN | SYSTOLIC BLOOD PRESSURE: 118 MMHG | OXYGEN SATURATION: 98 %

## 2025-02-27 DIAGNOSIS — I10 ESSENTIAL HYPERTENSION: ICD-10-CM

## 2025-02-27 DIAGNOSIS — E78.5 DYSLIPIDEMIA: ICD-10-CM

## 2025-02-27 DIAGNOSIS — G47.20 DISTURBED SLEEP RHYTHM: ICD-10-CM

## 2025-02-27 DIAGNOSIS — R40.0 DAYTIME SOMNOLENCE: ICD-10-CM

## 2025-02-27 DIAGNOSIS — I25.10 ATHSCL HEART DISEASE OF NATIVE CORONARY ARTERY W/O ANG PCTRS: Primary | ICD-10-CM

## 2025-02-27 PROCEDURE — 99214 OFFICE O/P EST MOD 30 MIN: CPT | Mod: 25 | Performed by: INTERNAL MEDICINE

## 2025-02-27 PROCEDURE — 3074F SYST BP LT 130 MM HG: CPT | Performed by: INTERNAL MEDICINE

## 2025-02-27 PROCEDURE — 3079F DIAST BP 80-89 MM HG: CPT | Performed by: INTERNAL MEDICINE

## 2025-02-27 PROCEDURE — 93010 ELECTROCARDIOGRAM REPORT: CPT | Performed by: INTERNAL MEDICINE

## 2025-02-27 PROCEDURE — 99214 OFFICE O/P EST MOD 30 MIN: CPT | Performed by: INTERNAL MEDICINE

## 2025-02-27 PROCEDURE — 1036F TOBACCO NON-USER: CPT | Performed by: INTERNAL MEDICINE

## 2025-02-27 PROCEDURE — 93005 ELECTROCARDIOGRAM TRACING: CPT | Performed by: INTERNAL MEDICINE

## 2025-02-27 PROCEDURE — 3008F BODY MASS INDEX DOCD: CPT | Performed by: INTERNAL MEDICINE

## 2025-02-27 RX ORDER — VALSARTAN 160 MG/1
160 TABLET ORAL 2 TIMES DAILY
Qty: 180 TABLET | Refills: 3 | Status: SHIPPED | OUTPATIENT
Start: 2025-02-27 | End: 2026-02-27

## 2025-05-16 ENCOUNTER — OFFICE VISIT (OUTPATIENT)
Dept: CARDIOLOGY | Facility: CLINIC | Age: 65
End: 2025-05-16
Payer: COMMERCIAL

## 2025-05-16 VITALS
OXYGEN SATURATION: 97 % | HEART RATE: 70 BPM | WEIGHT: 169 LBS | DIASTOLIC BLOOD PRESSURE: 72 MMHG | BODY MASS INDEX: 23.66 KG/M2 | SYSTOLIC BLOOD PRESSURE: 124 MMHG | HEIGHT: 71 IN

## 2025-05-16 DIAGNOSIS — E78.5 DYSLIPIDEMIA: ICD-10-CM

## 2025-05-16 DIAGNOSIS — R07.89 ATYPICAL CHEST PAIN: ICD-10-CM

## 2025-05-16 DIAGNOSIS — R40.0 DAYTIME SOMNOLENCE: ICD-10-CM

## 2025-05-16 DIAGNOSIS — G47.20 DISTURBED SLEEP RHYTHM: ICD-10-CM

## 2025-05-16 DIAGNOSIS — I25.10 ATHSCL HEART DISEASE OF NATIVE CORONARY ARTERY W/O ANG PCTRS: Primary | ICD-10-CM

## 2025-05-16 DIAGNOSIS — I10 ESSENTIAL HYPERTENSION: ICD-10-CM

## 2025-05-16 PROBLEM — I25.118 ATHEROSCLEROTIC HEART DISEASE OF NATIVE CORONARY ARTERY WITH OTHER FORMS OF ANGINA PECTORIS: Status: RESOLVED | Noted: 2023-11-24 | Resolved: 2025-05-16

## 2025-05-16 PROBLEM — I25.118 ATHEROSCLEROTIC HEART DISEASE OF NATIVE CORONARY ARTERY WITH OTHER FORMS OF ANGINA PECTORIS: Status: ACTIVE | Noted: 2023-11-24

## 2025-05-16 PROCEDURE — 99214 OFFICE O/P EST MOD 30 MIN: CPT | Performed by: INTERNAL MEDICINE

## 2025-05-16 PROCEDURE — 3008F BODY MASS INDEX DOCD: CPT | Performed by: INTERNAL MEDICINE

## 2025-05-16 PROCEDURE — 1036F TOBACCO NON-USER: CPT | Performed by: INTERNAL MEDICINE

## 2025-05-16 PROCEDURE — 3078F DIAST BP <80 MM HG: CPT | Performed by: INTERNAL MEDICINE

## 2025-05-16 PROCEDURE — 3074F SYST BP LT 130 MM HG: CPT | Performed by: INTERNAL MEDICINE

## 2025-05-16 RX ORDER — VALSARTAN 160 MG/1
160 TABLET ORAL 2 TIMES DAILY
Qty: 180 TABLET | Refills: 3 | Status: SHIPPED | OUTPATIENT
Start: 2025-05-16 | End: 2026-05-16

## 2025-05-16 RX ORDER — ATENOLOL 25 MG/1
25 TABLET ORAL DAILY
Qty: 90 TABLET | Refills: 3 | Status: SHIPPED | OUTPATIENT
Start: 2025-05-16 | End: 2026-05-16

## 2025-05-16 RX ORDER — AMLODIPINE BESYLATE 5 MG/1
5 TABLET ORAL DAILY
Qty: 90 TABLET | Refills: 3 | Status: SHIPPED | OUTPATIENT
Start: 2025-05-16 | End: 2026-05-16

## 2025-05-16 RX ORDER — ATORVASTATIN CALCIUM 80 MG/1
80 TABLET, FILM COATED ORAL NIGHTLY
Qty: 90 TABLET | Refills: 3 | Status: SHIPPED | OUTPATIENT
Start: 2025-05-16 | End: 2026-05-16

## 2025-05-16 RX ORDER — RIVAROXABAN 2.5 MG/1
2.5 TABLET, FILM COATED ORAL 2 TIMES DAILY
Qty: 180 TABLET | Refills: 3 | Status: SHIPPED | OUTPATIENT
Start: 2025-05-16 | End: 2026-05-16

## 2025-05-16 RX ORDER — RANOLAZINE 1000 MG/1
1000 TABLET, EXTENDED RELEASE ORAL 2 TIMES DAILY
Qty: 180 TABLET | Refills: 3 | Status: SHIPPED | OUTPATIENT
Start: 2025-05-16 | End: 2026-05-16

## 2025-05-16 NOTE — PROGRESS NOTES
"Chief Complaint:   No chief complaint on file.     History Of Present Illness:    Dc Elizondo \"Lokesh\" is a 64 y.o. male with a history of hypertension, dyslipidemia, and CAD ( PCI to the LAD and LCx 5/1/2020 -RCA with ) being evaluated for routine follow-up.      Still taking 0.5 tab norvasc, fatigue reduced. Can open jars and bottles now. Still gets some discomfort every so often, more diffuse, not major.  It is occurred twice since his last visit and both episodes resolved with nitroglycerin.  Both occurred during stressful work times. Retires in 20 months.     Has decreased his exercise but is hoping to increase during the summer.      Didn't do sleep study. Trying to take elavil earlier, says it is helping him wind down earlier. Trying to sleep at night, so will take elavil at 6. Only thing that wakes him up is his grandson from time to time.  Also notices that he has a hard time initiating sleep when his mind races.  Taking the Elavil is helping. He also isn't as tired when he comes home from work. Less daytime sleepiness, says he is sleeping better.     Patient saw Tracy Schwab on 7/30/2024.  I reviewed that note.    Lexiscan nuclear stress test 10/16/2023: No evidence of ischemia or scar.  EF 71%.    Echocardiogram 9/30/2023: EF 55 to 60%.     PCI of the LAD and circumflex 5/1/20. LAD stented with Resolute [place] 3.0 x 22 mm HAVEN and 3.0 x 8 mm HAVEN. PCI of OM1 with Resolute Jonny 2.5 x 22 mm HAVEN. RCA with . Fills via left to right collaterals.     Echocardiogram 8/6/20: Normal LV size and function, EF 60-65%.      Past Medical History:  He has a past medical history of Glaucoma suspect and Other chest pain (10/30/2019).    Past Surgical History:  He has a past surgical history that includes Other surgical history (05/15/2020).      Social History:  He reports that he quit smoking about 49 years ago. His smoking use included cigarettes. He has never used smokeless tobacco. He reports current " "alcohol use of about 5.0 standard drinks of alcohol per week. He reports that he does not currently use drugs.    Family History:  Family History   Problem Relation Name Age of Onset    Glaucoma Maternal Grandmother          Allergies:  Iodinated contrast media    Outpatient Medications:  Current Outpatient Medications   Medication Instructions    albuterol 90 mcg/actuation inhaler 2 puffs, Every 6 hours PRN    amitriptyline (ELAVIL) 30 mg, oral, Nightly    amLODIPine (NORVASC) 5 mg, oral, Daily    ashwagandha root extract 500 mg capsule Take by mouth.    aspirin 81 mg, oral, Daily    atenolol (TENORMIN) 25 mg, oral, Daily    atorvastatin (LIPITOR) 80 mg, oral, Nightly    coQ10 (ubiquinol) 200 mg, Daily    fluticasone (Flonase) 50 mcg/actuation nasal spray 1 spray, Every other day    fluticasone propion-salmeteroL (Advair Diskus) 250-50 mcg/dose diskus inhaler 1 puff, inhalation, Daily PRN    felix, Zingiber officinalis, 500 mg capsule Take by mouth.    latanoprost (Xalatan) 0.005 % ophthalmic solution 1 drop, Nightly    loratadine (CLARITIN REDITABS) 10 mg, Daily    ludmila, bulk, powder Use as directed.    magnesium 200 mg tablet 1 tablet, Daily    multivitamin tablet Take by mouth.    nitroglycerin (Nitrostat) 0.4 mg SL tablet place 1 tablet by mouth under the tongue every 5 minutes if needed for chest pain    ranolazine (RANEXA) 1,000 mg, oral, 2 times daily    rivaroxaban (XARELTO) 2.5 mg, oral, 2 times daily    sertraline (ZOLOFT) 50 mg, oral, Daily    triamcinolone (Nasacort) 55 mcg nasal inhaler 1 spray, Every other day    TURMERIC ORAL 1 capsule, Daily    valsartan (DIOVAN) 160 mg, oral, 2 times daily       Last Recorded Vitals:  Visit Vitals  /72 (BP Location: Left arm, Patient Position: Sitting)   Pulse 70   Ht 1.803 m (5' 11\")   Wt 76.7 kg (169 lb)   SpO2 97%   BMI 23.57 kg/m²   Smoking Status Former   BSA 1.96 m²      LASTWT(3):   Wt Readings from Last 3 Encounters:   05/16/25 76.7 kg (169 lb) "   02/27/25 77.6 kg (171 lb)   12/18/24 77.1 kg (170 lb)       Physical Exam:  In general: alert and in no acute distress.   HEENT: Carotid upstrokes normal with no bruits. JVP is normal.   Pulmonary: Clear to auscultation bilaterally.  Cardiovascular: S1,S2, regular. No appreciable murmurs, rubs or gallops.   Lower extremities: Warm. 2+ distal pulses. No edema.     Last Labs:  CBC -  Recent Labs     07/03/24  1221 06/12/24  1021 10/03/23  0354   WBC 4.4 4.8 CANCELED   HGB 13.1* 12.9* CANCELED   HCT 40.6* 40.2* CANCELED    265 CANCELED   MCV 87 88 CANCELED       CMP -  Recent Labs     06/12/24  1021 10/04/23  0358 09/30/23  0747 09/29/23  1635    CANCELED 140 140   K 4.5 CANCELED 4.5 4.6    CANCELED 107 107   CO2 28 CANCELED 28 27   ANIONGAP 12 CANCELED 10 11   BUN 17 CANCELED 12 17   CREATININE 0.87 CANCELED 0.88 0.86   EGFR >90  --  >90  --    MG  --  CANCELED 2.09 2.11     Recent Labs     06/12/24  1021 09/29/23  1305 07/20/23  0738   ALBUMIN 4.4 4.4 4.3   ALKPHOS 60 56 56   ALT 19 22 22   AST 18 19 19   BILITOT 0.7 0.9 1.0       LIPID PANEL -   Recent Labs     06/12/24  1021 07/20/23  0738 07/01/22  0729 05/05/21  0736   CHOL 111 116 92 110   LDLCALC 49  --   --   --    LDLF  --  57 41 54   HDL 40.5 39.9* 33.0* 36.5*   TRIG 106 94 91 97       Recent Labs     05/01/20  0536 04/30/20  1339 11/21/19  0753   BNP  --  69  --    HGBA1C 5.2  --  5.3           Assessment/Plan   1) CAD: PCI to the LAD and circumflex in 2020.  Known occluded RCA but nuclear stress testing 2023 demonstrated no evidence of ischemia.  Intermittent episodes of chest discomfort requiring nitroglycerin however these seem to be tolerable.  They are not interrupting with his day-to-day activities for the most part.  Also says that he will be retiring 20 months.  I believe that removing himself from the work environment may also significantly decrease the triggers for his episodes of chest pain.    At this point I would recommend  that we continue his ranolazine 1000 mg twice daily.    Also remains on Xarelto 2.5 mg twice daily based on the COMPASS Trial for chronic stable CAD.     2) dyslipidemia: Continue atorvastatin 80 mg. LDL at goal.  Will be having labs soon.     3) hypertension: Very well controlled on 2.5 mg amlodipine. He does believe his fatigue has reduced since cutting it in half.     4) weakness and fatigue: Doing better on 2.5 mg amlodipine vs 5 mg. Fatigue has reduced, and he is not weak anymore. He has found that taking the Elavil earlier has improved his night time sleep and decreased his day time sleepiness. He did not get a sleep study done. He can refrain from getting a sleep study at this time as these changes have improved his sleep quality.     5) follow-up: 6 months or sooner if needed.       Benjamín Rodriguez MD

## 2025-05-18 DIAGNOSIS — F41.9 ANXIETY: ICD-10-CM

## 2025-05-19 RX ORDER — AMITRIPTYLINE HYDROCHLORIDE 10 MG/1
30 TABLET, FILM COATED ORAL NIGHTLY
Qty: 90 TABLET | Refills: 0 | Status: SHIPPED | OUTPATIENT
Start: 2025-05-19

## 2025-06-18 ENCOUNTER — APPOINTMENT (OUTPATIENT)
Dept: PRIMARY CARE | Facility: CLINIC | Age: 65
End: 2025-06-18
Payer: COMMERCIAL

## 2025-06-18 VITALS
RESPIRATION RATE: 14 BRPM | OXYGEN SATURATION: 97 % | TEMPERATURE: 97 F | SYSTOLIC BLOOD PRESSURE: 110 MMHG | HEIGHT: 71 IN | BODY MASS INDEX: 23.66 KG/M2 | DIASTOLIC BLOOD PRESSURE: 60 MMHG | HEART RATE: 70 BPM | WEIGHT: 169 LBS

## 2025-06-18 DIAGNOSIS — E78.5 DYSLIPIDEMIA: ICD-10-CM

## 2025-06-18 DIAGNOSIS — Z00.00 PERIODIC HEALTH ASSESSMENT, GENERAL SCREENING, ADULT: Primary | ICD-10-CM

## 2025-06-18 DIAGNOSIS — I10 ESSENTIAL HYPERTENSION: ICD-10-CM

## 2025-06-18 DIAGNOSIS — J45.20 MILD INTERMITTENT ASTHMA, UNSPECIFIED WHETHER COMPLICATED (HHS-HCC): ICD-10-CM

## 2025-06-18 DIAGNOSIS — F41.9 ANXIETY: ICD-10-CM

## 2025-06-18 PROBLEM — J45.909 ASTHMA: Status: RESOLVED | Noted: 2023-09-30 | Resolved: 2025-06-18

## 2025-06-18 PROCEDURE — 3074F SYST BP LT 130 MM HG: CPT | Performed by: INTERNAL MEDICINE

## 2025-06-18 PROCEDURE — 99396 PREV VISIT EST AGE 40-64: CPT | Performed by: INTERNAL MEDICINE

## 2025-06-18 PROCEDURE — 3078F DIAST BP <80 MM HG: CPT | Performed by: INTERNAL MEDICINE

## 2025-06-18 PROCEDURE — 1036F TOBACCO NON-USER: CPT | Performed by: INTERNAL MEDICINE

## 2025-06-18 PROCEDURE — 3008F BODY MASS INDEX DOCD: CPT | Performed by: INTERNAL MEDICINE

## 2025-06-18 RX ORDER — AMLODIPINE BESYLATE 5 MG/1
2.5 TABLET ORAL DAILY
Start: 2025-06-18 | End: 2026-06-18

## 2025-06-18 RX ORDER — SERTRALINE HYDROCHLORIDE 50 MG/1
50 TABLET, FILM COATED ORAL DAILY
Qty: 90 TABLET | Refills: 3 | Status: SHIPPED | OUTPATIENT
Start: 2025-06-18 | End: 2026-06-18

## 2025-06-18 RX ORDER — AMITRIPTYLINE HYDROCHLORIDE 10 MG/1
30 TABLET, FILM COATED ORAL NIGHTLY
Qty: 270 TABLET | Refills: 3 | Status: SHIPPED | OUTPATIENT
Start: 2025-06-18 | End: 2026-06-18

## 2025-06-18 RX ORDER — NORTRIPTYLINE HYDROCHLORIDE 10 MG/1
10 CAPSULE ORAL 2 TIMES DAILY
COMMUNITY
Start: 2024-09-24 | End: 2025-06-18 | Stop reason: ALTCHOICE

## 2025-06-18 ASSESSMENT — ENCOUNTER SYMPTOMS
ABDOMINAL PAIN: 0
DIARRHEA: 0
HYPERTENSION: 1
WHEEZING: 0
COUGH: 0
CONSTIPATION: 0
SHORTNESS OF BREATH: 0
NAUSEA: 0

## 2025-06-18 ASSESSMENT — PATIENT HEALTH QUESTIONNAIRE - PHQ9
SUM OF ALL RESPONSES TO PHQ9 QUESTIONS 1 AND 2: 0
1. LITTLE INTEREST OR PLEASURE IN DOING THINGS: NOT AT ALL
2. FEELING DOWN, DEPRESSED OR HOPELESS: NOT AT ALL

## 2025-06-18 NOTE — PROGRESS NOTES
"Subjective   Patient ID: Lokesh Elizondo is a 64 y.o. male who presents for APE - Hypertension.    Overall doing well.  Patient is fairly active.  Denies any issues with CP,SOB or dizzy spells.  Some issues with stress and anxiety, but feels he is handling it well.  No issues with depression.  Some chronic sleep related problems.   Denies any issues with HA, numbness or tingling.  No issues or changes with bowel or bladder habits.      Hypertension  Pertinent negatives include no chest pain or shortness of breath.        Review of Systems   Respiratory:  Negative for cough, shortness of breath and wheezing.    Cardiovascular:  Negative for chest pain and leg swelling.   Gastrointestinal:  Negative for abdominal pain, constipation, diarrhea and nausea.       Objective   /60 (BP Location: Right arm, Patient Position: Sitting, BP Cuff Size: Adult)   Pulse 70   Temp 36.1 °C (97 °F) (Tympanic)   Resp 14   Ht 1.803 m (5' 11\")   Wt 76.7 kg (169 lb)   SpO2 97%   BMI 23.57 kg/m²     Physical Exam  Vitals reviewed.   Constitutional:       Appearance: Normal appearance.   HENT:      Head: Normocephalic.   Eyes:      Pupils: Pupils are equal, round, and reactive to light.   Cardiovascular:      Rate and Rhythm: Normal rate and regular rhythm.   Pulmonary:      Effort: Pulmonary effort is normal.      Breath sounds: Normal breath sounds.   Musculoskeletal:         General: Normal range of motion.   Neurological:      General: No focal deficit present.      Mental Status: He is alert.   Psychiatric:         Mood and Affect: Mood normal.         Assessment/Plan   Problem List Items Addressed This Visit           ICD-10-CM    Anxiety F41.9    Relevant Medications    amitriptyline (Elavil) 10 mg tablet    sertraline (Zoloft) 50 mg tablet    RESOLVED: Asthma J45.909    Dyslipidemia E78.5    Essential hypertension - Primary I10    Relevant Medications    amLODIPine (Norvasc) 5 mg tablet   Physical exam is " unremarkable.  We reviewed and discussed all the above.  We discussed current medications as well as most recent test results.  We discussed the importance and benefits of a healthy diet that is both low in sugars and low in saturated fats.  We reviewed and discussed the benefits of regular physical exercise especially when at or above a level of 150 minutes/week.  We also discussed the importance of stress management and good sleep hygiene.  We will continue to work on lifestyle improvements and follow-up in 6 to 12 months, sooner if any issues should arise.

## 2025-08-02 LAB
ALBUMIN SERPL-MCNC: 4.3 G/DL (ref 3.6–5.1)
ALP SERPL-CCNC: 49 U/L (ref 35–144)
ALT SERPL-CCNC: 19 U/L (ref 9–46)
ANION GAP SERPL CALCULATED.4IONS-SCNC: 11 MMOL/L (CALC) (ref 7–17)
AST SERPL-CCNC: 18 U/L (ref 10–35)
BILIRUB SERPL-MCNC: 0.8 MG/DL (ref 0.2–1.2)
BUN SERPL-MCNC: 13 MG/DL (ref 7–25)
CALCIUM SERPL-MCNC: 9.4 MG/DL (ref 8.6–10.3)
CHLORIDE SERPL-SCNC: 108 MMOL/L (ref 98–110)
CHOLEST SERPL-MCNC: 122 MG/DL
CHOLEST/HDLC SERPL: 2.9 (CALC)
CO2 SERPL-SCNC: 25 MMOL/L (ref 20–32)
CREAT SERPL-MCNC: 0.98 MG/DL (ref 0.7–1.35)
EGFRCR SERPLBLD CKD-EPI 2021: 86 ML/MIN/1.73M2
ERYTHROCYTE [DISTWIDTH] IN BLOOD BY AUTOMATED COUNT: 13.3 % (ref 11–15)
GLUCOSE SERPL-MCNC: 97 MG/DL (ref 65–99)
HCT VFR BLD AUTO: 43.5 % (ref 38.5–50)
HDLC SERPL-MCNC: 42 MG/DL
HGB BLD-MCNC: 13.7 G/DL (ref 13.2–17.1)
LDLC SERPL CALC-MCNC: 61 MG/DL (CALC)
MCH RBC QN AUTO: 28.8 PG (ref 27–33)
MCHC RBC AUTO-ENTMCNC: 31.5 G/DL (ref 32–36)
MCV RBC AUTO: 91.6 FL (ref 80–100)
NONHDLC SERPL-MCNC: 80 MG/DL (CALC)
PLATELET # BLD AUTO: 264 THOUSAND/UL (ref 140–400)
PMV BLD REES-ECKER: 10.5 FL (ref 7.5–12.5)
POTASSIUM SERPL-SCNC: 5.2 MMOL/L (ref 3.5–5.3)
PROT SERPL-MCNC: 6.7 G/DL (ref 6.1–8.1)
PSA SERPL-MCNC: 2.88 NG/ML
RBC # BLD AUTO: 4.75 MILLION/UL (ref 4.2–5.8)
SODIUM SERPL-SCNC: 144 MMOL/L (ref 135–146)
TRIGL SERPL-MCNC: 107 MG/DL
TSH SERPL-ACNC: 2.05 MIU/L (ref 0.4–4.5)
WBC # BLD AUTO: 3.8 THOUSAND/UL (ref 3.8–10.8)

## 2025-08-13 ENCOUNTER — OFFICE VISIT (OUTPATIENT)
Dept: OPHTHALMOLOGY | Facility: CLINIC | Age: 65
End: 2025-08-13
Payer: COMMERCIAL

## 2025-08-13 DIAGNOSIS — H43.393 VITREOUS FLOATERS OF BOTH EYES: Primary | ICD-10-CM

## 2025-08-13 DIAGNOSIS — H43.811 POSTERIOR VITREOUS DETACHMENT OF RIGHT EYE: ICD-10-CM

## 2025-08-13 PROCEDURE — 99214 OFFICE O/P EST MOD 30 MIN: CPT | Performed by: OPHTHALMOLOGY

## 2025-08-13 PROCEDURE — 92134 CPTRZ OPH DX IMG PST SGM RTA: CPT | Mod: BILATERAL PROCEDURE | Performed by: OPHTHALMOLOGY

## 2025-08-13 ASSESSMENT — ENCOUNTER SYMPTOMS
RESPIRATORY NEGATIVE: 0
NEUROLOGICAL NEGATIVE: 0
MUSCULOSKELETAL NEGATIVE: 0
HEMATOLOGIC/LYMPHATIC COMMENTS: ON XARELTO
EYES NEGATIVE: 1
ALLERGIC/IMMUNOLOGIC NEGATIVE: 0
ENDOCRINE NEGATIVE: 0
CONSTITUTIONAL NEGATIVE: 0
PSYCHIATRIC NEGATIVE: 0
HEMATOLOGIC/LYMPHATIC NEGATIVE: 0
GASTROINTESTINAL NEGATIVE: 0
CARDIOVASCULAR NEGATIVE: 1

## 2025-08-13 ASSESSMENT — SLIT LAMP EXAM - LIDS
COMMENTS: GOOD POSITION
COMMENTS: GOOD POSITION

## 2025-08-13 ASSESSMENT — VISUAL ACUITY
OD_CC+: +2
OD_CC: 20/25
CORRECTION_TYPE: GLASSES
METHOD: SNELLEN - LINEAR
OS_CC+: +1
OS_CC: 20/25

## 2025-08-13 ASSESSMENT — EXTERNAL EXAM - RIGHT EYE: OD_EXAM: NORMAL

## 2025-08-13 ASSESSMENT — PACHYMETRY
OS_CT(UM): 570
OD_CT(UM): 566

## 2025-08-13 ASSESSMENT — CUP TO DISC RATIO
OS_RATIO: 0.7
OD_RATIO: 0.70

## 2025-08-13 ASSESSMENT — EXTERNAL EXAM - LEFT EYE: OS_EXAM: NORMAL

## 2025-08-13 ASSESSMENT — TONOMETRY
OD_IOP_MMHG: 13
OS_IOP_MMHG: 13
IOP_METHOD: GOLDMANN APPLANATION

## 2025-08-26 ENCOUNTER — APPOINTMENT (OUTPATIENT)
Dept: OPHTHALMOLOGY | Facility: CLINIC | Age: 65
End: 2025-08-26
Payer: COMMERCIAL

## 2025-08-26 DIAGNOSIS — H40.003 GLAUCOMA SUSPECT OF BOTH EYES: Primary | ICD-10-CM

## 2025-08-26 PROCEDURE — 99213 OFFICE O/P EST LOW 20 MIN: CPT | Performed by: OPHTHALMOLOGY

## 2025-08-26 RX ORDER — LATANOPROST 50 UG/ML
1 SOLUTION/ DROPS OPHTHALMIC NIGHTLY
Qty: 7.5 ML | Refills: 3 | Status: SHIPPED | OUTPATIENT
Start: 2025-08-26

## 2025-08-26 ASSESSMENT — CONF VISUAL FIELD
OS_INFERIOR_TEMPORAL_RESTRICTION: 0
OD_SUPERIOR_NASAL_RESTRICTION: 0
OS_SUPERIOR_TEMPORAL_RESTRICTION: 0
OS_SUPERIOR_NASAL_RESTRICTION: 0
OD_NORMAL: 1
OD_INFERIOR_NASAL_RESTRICTION: 0
OD_INFERIOR_TEMPORAL_RESTRICTION: 0
OS_NORMAL: 1
OS_INFERIOR_NASAL_RESTRICTION: 0
OD_SUPERIOR_TEMPORAL_RESTRICTION: 0

## 2025-08-26 ASSESSMENT — EXTERNAL EXAM - LEFT EYE: OS_EXAM: NORMAL

## 2025-08-26 ASSESSMENT — VISUAL ACUITY
OS_CC: 20/20
METHOD: SNELLEN - LINEAR
OD_CC: 20/20

## 2025-08-26 ASSESSMENT — SLIT LAMP EXAM - LIDS
COMMENTS: GOOD POSITION
COMMENTS: GOOD POSITION

## 2025-08-26 ASSESSMENT — PACHYMETRY
OD_CT(UM): 566
OS_CT(UM): 570

## 2025-08-26 ASSESSMENT — TONOMETRY
IOP_METHOD: GOLDMANN APPLANATION
OS_IOP_MMHG: 11
OD_IOP_MMHG: 11

## 2025-08-26 ASSESSMENT — EXTERNAL EXAM - RIGHT EYE: OD_EXAM: NORMAL

## 2025-08-26 ASSESSMENT — ENCOUNTER SYMPTOMS: EYES NEGATIVE: 1

## 2025-08-27 ENCOUNTER — APPOINTMENT (OUTPATIENT)
Dept: OPHTHALMOLOGY | Facility: CLINIC | Age: 65
End: 2025-08-27
Payer: COMMERCIAL

## 2025-08-27 DIAGNOSIS — H35.073 TYPE 2 MACULAR TELANGIECTASIS OF BOTH EYES: ICD-10-CM

## 2025-08-27 DIAGNOSIS — H43.393 VITREOUS FLOATERS OF BOTH EYES: ICD-10-CM

## 2025-08-27 DIAGNOSIS — H40.003 GLAUCOMA SUSPECT OF BOTH EYES: Primary | ICD-10-CM

## 2025-08-27 PROCEDURE — 99213 OFFICE O/P EST LOW 20 MIN: CPT | Performed by: OPHTHALMOLOGY

## 2025-08-27 ASSESSMENT — ENCOUNTER SYMPTOMS
EYES NEGATIVE: 1
ENDOCRINE NEGATIVE: 0
PSYCHIATRIC NEGATIVE: 0
RESPIRATORY NEGATIVE: 0
NEUROLOGICAL NEGATIVE: 0
HEMATOLOGIC/LYMPHATIC NEGATIVE: 0
CONSTITUTIONAL NEGATIVE: 0
CARDIOVASCULAR NEGATIVE: 0
MUSCULOSKELETAL NEGATIVE: 0
GASTROINTESTINAL NEGATIVE: 0
ALLERGIC/IMMUNOLOGIC NEGATIVE: 0

## 2025-08-27 ASSESSMENT — EXTERNAL EXAM - LEFT EYE: OS_EXAM: NORMAL

## 2025-08-27 ASSESSMENT — VISUAL ACUITY
METHOD: SNELLEN - LINEAR
OD_CC: 20/20
OS_CC+: -1
OD_CC+: -1
OS_CC: 20/20
CORRECTION_TYPE: GLASSES

## 2025-08-27 ASSESSMENT — EXTERNAL EXAM - RIGHT EYE: OD_EXAM: NORMAL

## 2025-08-27 ASSESSMENT — REFRACTION_WEARINGRX
OD_CYLINDER: -0.25
OS_ADD: +2.50
OD_SPHERE: -0.25
OS_SPHERE: PLANO
OS_AXIS: 120
OD_ADD: +2.50
OS_CYLINDER: -0.50
OD_AXIS: 180

## 2025-08-27 ASSESSMENT — PACHYMETRY
OD_CT(UM): 566
OS_CT(UM): 570

## 2025-08-27 ASSESSMENT — SLIT LAMP EXAM - LIDS
COMMENTS: GOOD POSITION
COMMENTS: GOOD POSITION

## 2025-08-27 ASSESSMENT — CUP TO DISC RATIO
OS_RATIO: 0.7
OD_RATIO: 0.70

## 2025-08-27 ASSESSMENT — TONOMETRY
OD_IOP_MMHG: 9
IOP_METHOD: TONOPEN
OS_IOP_MMHG: 12

## 2025-10-20 ENCOUNTER — APPOINTMENT (OUTPATIENT)
Dept: OPHTHALMOLOGY | Facility: CLINIC | Age: 65
End: 2025-10-20
Payer: COMMERCIAL

## 2025-11-25 ENCOUNTER — APPOINTMENT (OUTPATIENT)
Dept: CARDIOLOGY | Facility: CLINIC | Age: 65
End: 2025-11-25
Payer: COMMERCIAL

## 2026-03-06 ENCOUNTER — APPOINTMENT (OUTPATIENT)
Dept: OPHTHALMOLOGY | Facility: CLINIC | Age: 66
End: 2026-03-06
Payer: COMMERCIAL

## 2026-07-08 ENCOUNTER — APPOINTMENT (OUTPATIENT)
Dept: PRIMARY CARE | Facility: CLINIC | Age: 66
End: 2026-07-08
Payer: COMMERCIAL